# Patient Record
Sex: MALE | Race: WHITE | NOT HISPANIC OR LATINO | Employment: FULL TIME | URBAN - METROPOLITAN AREA
[De-identification: names, ages, dates, MRNs, and addresses within clinical notes are randomized per-mention and may not be internally consistent; named-entity substitution may affect disease eponyms.]

---

## 2023-02-07 ENCOUNTER — VIRTUAL VISIT (OUTPATIENT)
Dept: FAMILY MEDICINE | Facility: CLINIC | Age: 58
End: 2023-02-07
Payer: COMMERCIAL

## 2023-02-07 DIAGNOSIS — Z20.822 EXPOSURE TO COVID-19 VIRUS: Primary | ICD-10-CM

## 2023-02-07 PROCEDURE — 99203 OFFICE O/P NEW LOW 30 MIN: CPT | Mod: CS | Performed by: FAMILY MEDICINE

## 2023-02-07 RX ORDER — ACETAMINOPHEN 325 MG/1
325-650 TABLET ORAL EVERY 6 HOURS PRN
COMMUNITY
Start: 2021-05-28 | End: 2023-02-15

## 2023-02-07 RX ORDER — ACETAMINOPHEN 500 MG
500-1000 TABLET ORAL EVERY 4 HOURS PRN
Qty: 30 TABLET | Refills: 0 | Status: SHIPPED | OUTPATIENT
Start: 2023-02-07 | End: 2023-09-29

## 2023-02-07 RX ORDER — ESCITALOPRAM OXALATE 20 MG/1
20 TABLET ORAL DAILY
COMMUNITY
Start: 2023-01-16 | End: 2023-02-15

## 2023-02-07 RX ORDER — AMLODIPINE BESYLATE 10 MG/1
1 TABLET ORAL DAILY
COMMUNITY
Start: 2023-01-14 | End: 2023-02-15

## 2023-02-07 RX ORDER — LISINOPRIL 40 MG/1
40 TABLET ORAL DAILY
COMMUNITY
Start: 2023-01-16 | End: 2023-02-15

## 2023-02-07 NOTE — PROGRESS NOTES
"John is a 57 year old who is being evaluated via a billable telephone visit.      What phone number would you like to be contacted at? [phone]  How would you like to obtain your AVS? Mail a copy    Distant Location (provider location):  On-site    Assessment & Plan     Exposure to COVID-19 virus  I have offered curbside testing.  Patient would like to know his results today to help decide when he should go back to work.  He will purchase a new home test kit and if he is testing positive he will let us know and we will proceed with Paxlovid.  He will either take half a dose of amlodipine daily or take it every other days for the duration of his treatment.  No other concerns with his remaining medications.  Continue with symptomatic treatment.  - acetaminophen (TYLENOL) 500 MG tablet; Take 1-2 tablets (500-1,000 mg) by mouth every 4 hours as needed for mild pain             Nicotine/Tobacco Cessation:  He reports that he has been smoking cigarettes and other. He has never used smokeless tobacco.  Nicotine/Tobacco Cessation Plan:         COVID-19 positive patient.  Encounter for consideration of medication intervention. Patient does not qualify for a prescription. Full discussion with patient including medication options, risks and benefits. Potential drug interactions reviewed with patient.     Treatment Planned     Temporary change to home medications:     Estimated body mass index is 33.01 kg/m  as calculated from the following:    Height as of 11/26/07: 1.816 m (5' 11.5\").    Weight as of 2/21/08: 108.9 kg (240 lb).  GFR Estimate   Date Value Ref Range Status   04/04/2007 73 >60 mL/min/1.7m2 Final     No results found for: [id number]    No follow-ups on file.    Richard Plummer MD  [facility]    Subjective   John is a 57 year old accompanied by his self, presenting for the following health issues:  Covid Concern (Verbal consent given for telemed visit.  Discuss COVID " exposure)      HPI       COVID-19 Symptom Review  How many days ago did these symptoms start? 2-3 days ago.  Patient was exposed to people who tested positive for COVID    Are any of the following symptoms significant for you?    New or worsening difficulty breathing? No    Worsening cough? Yes, it's a dry cough. Has slight dry cough    Fever or chills? No    Headache: No but has nasal congestion    Sore throat: scratchy throat    Chest pain: No    Diarrhea: No    Body aches? No but does have fatigue    What treatments has patient tried? Acetaminophen   Does patient live in a nursing home, group home, or shelter? No  Does patient have a way to get food/medications during quarantined? Yes, I have a friend or family member who can help me.          He has tried testing at home but his test was invalid.  He is not immunized.        Review of Systems   Constitutional, HEENT, cardiovascular, pulmonary, gi and gu systems are negative, except as otherwise noted.      Objective           Vitals:  No vitals were obtained today due to virtual visit.    Physical Exam   healthy, alert and no distress  PSYCH: Alert and oriented times 3; coherent speech, normal   rate and volume, able to articulate logical thoughts, able   to abstract reason, no tangential thoughts, no hallucinations   or delusions  His affect is normal  RESP: No cough, no audible wheezing, able to talk in full sentences  Remainder of exam unable to be completed due to telephone visits                Phone call duration: 10 minutes

## 2023-02-15 ENCOUNTER — OFFICE VISIT (OUTPATIENT)
Dept: FAMILY MEDICINE | Facility: CLINIC | Age: 58
End: 2023-02-15
Payer: COMMERCIAL

## 2023-02-15 ENCOUNTER — TELEPHONE (OUTPATIENT)
Dept: FAMILY MEDICINE | Facility: CLINIC | Age: 58
End: 2023-02-15

## 2023-02-15 VITALS
HEART RATE: 76 BPM | OXYGEN SATURATION: 96 % | SYSTOLIC BLOOD PRESSURE: 130 MMHG | TEMPERATURE: 97.6 F | RESPIRATION RATE: 14 BRPM | DIASTOLIC BLOOD PRESSURE: 72 MMHG | BODY MASS INDEX: 34.35 KG/M2 | HEIGHT: 71 IN | WEIGHT: 245.4 LBS

## 2023-02-15 DIAGNOSIS — K42.9 UMBILICAL HERNIA WITHOUT OBSTRUCTION AND WITHOUT GANGRENE: Primary | ICD-10-CM

## 2023-02-15 DIAGNOSIS — Z13.220 SCREENING FOR HYPERLIPIDEMIA: ICD-10-CM

## 2023-02-15 DIAGNOSIS — I10 ESSENTIAL HYPERTENSION, BENIGN: ICD-10-CM

## 2023-02-15 DIAGNOSIS — Z12.11 SCREEN FOR COLON CANCER: ICD-10-CM

## 2023-02-15 DIAGNOSIS — N28.89 RENAL MASS, RIGHT: ICD-10-CM

## 2023-02-15 DIAGNOSIS — F33.41 MAJOR DEPRESSIVE DISORDER, RECURRENT EPISODE, IN PARTIAL REMISSION (H): ICD-10-CM

## 2023-02-15 DIAGNOSIS — E11.9 TYPE 2 DIABETES MELLITUS WITHOUT COMPLICATION, WITHOUT LONG-TERM CURRENT USE OF INSULIN (H): ICD-10-CM

## 2023-02-15 LAB
ANION GAP SERPL CALCULATED.3IONS-SCNC: 11 MMOL/L (ref 7–15)
BUN SERPL-MCNC: 17.4 MG/DL (ref 6–20)
CALCIUM SERPL-MCNC: 9.6 MG/DL (ref 8.6–10)
CHLORIDE SERPL-SCNC: 101 MMOL/L (ref 98–107)
CHOLEST SERPL-MCNC: 230 MG/DL
CREAT SERPL-MCNC: 0.95 MG/DL (ref 0.67–1.17)
DEPRECATED HCO3 PLAS-SCNC: 26 MMOL/L (ref 22–29)
ERYTHROCYTE [DISTWIDTH] IN BLOOD BY AUTOMATED COUNT: 12.8 % (ref 10–15)
GFR SERPL CREATININE-BSD FRML MDRD: >90 ML/MIN/1.73M2
GLUCOSE SERPL-MCNC: 131 MG/DL (ref 70–99)
HBA1C MFR BLD: 6.1 % (ref 0–5.6)
HCT VFR BLD AUTO: 45.6 % (ref 40–53)
HDLC SERPL-MCNC: 62 MG/DL
HGB BLD-MCNC: 15.2 G/DL (ref 13.3–17.7)
LDLC SERPL CALC-MCNC: 148 MG/DL
MCH RBC QN AUTO: 31.3 PG (ref 26.5–33)
MCHC RBC AUTO-ENTMCNC: 33.3 G/DL (ref 31.5–36.5)
MCV RBC AUTO: 94 FL (ref 78–100)
NONHDLC SERPL-MCNC: 168 MG/DL
PLATELET # BLD AUTO: 192 10E3/UL (ref 150–450)
POTASSIUM SERPL-SCNC: 5.1 MMOL/L (ref 3.4–5.3)
RBC # BLD AUTO: 4.86 10E6/UL (ref 4.4–5.9)
SODIUM SERPL-SCNC: 138 MMOL/L (ref 136–145)
TRIGL SERPL-MCNC: 98 MG/DL
WBC # BLD AUTO: 7.8 10E3/UL (ref 4–11)

## 2023-02-15 PROCEDURE — 80048 BASIC METABOLIC PNL TOTAL CA: CPT

## 2023-02-15 PROCEDURE — 85027 COMPLETE CBC AUTOMATED: CPT

## 2023-02-15 PROCEDURE — 80061 LIPID PANEL: CPT

## 2023-02-15 PROCEDURE — 99214 OFFICE O/P EST MOD 30 MIN: CPT

## 2023-02-15 PROCEDURE — 83036 HEMOGLOBIN GLYCOSYLATED A1C: CPT

## 2023-02-15 PROCEDURE — 36415 COLL VENOUS BLD VENIPUNCTURE: CPT

## 2023-02-15 RX ORDER — AMLODIPINE BESYLATE 10 MG/1
10 TABLET ORAL DAILY
Qty: 90 TABLET | Refills: 1 | Status: SHIPPED | OUTPATIENT
Start: 2023-02-15 | End: 2023-09-29

## 2023-02-15 RX ORDER — ESCITALOPRAM OXALATE 20 MG/1
20 TABLET ORAL DAILY
Qty: 90 TABLET | Refills: 1 | Status: SHIPPED | OUTPATIENT
Start: 2023-02-15 | End: 2023-09-29

## 2023-02-15 RX ORDER — LISINOPRIL 40 MG/1
40 TABLET ORAL DAILY
Qty: 90 TABLET | Refills: 1 | Status: SHIPPED | OUTPATIENT
Start: 2023-02-15 | End: 2023-09-29

## 2023-02-15 ASSESSMENT — ENCOUNTER SYMPTOMS
CONSTIPATION: 0
ENDOCRINE NEGATIVE: 1
ABDOMINAL PAIN: 1
NAUSEA: 0
RESPIRATORY NEGATIVE: 1
CONSTITUTIONAL NEGATIVE: 1
DIARRHEA: 0
DYSURIA: 0
HEMATOLOGIC/LYMPHATIC NEGATIVE: 1
DIZZINESS: 0
PSYCHIATRIC NEGATIVE: 1
MUSCULOSKELETAL NEGATIVE: 1
SHORTNESS OF BREATH: 0
EYES NEGATIVE: 1
ALLERGIC/IMMUNOLOGIC NEGATIVE: 1
LIGHT-HEADEDNESS: 0
CARDIOVASCULAR NEGATIVE: 1
NEUROLOGICAL NEGATIVE: 1
APPETITE CHANGE: 0
DIFFICULTY URINATING: 0
FEVER: 0
CHILLS: 0
VOMITING: 0

## 2023-02-15 NOTE — PROGRESS NOTES
Assessment & Plan     Umbilical hernia without obstruction and without gangrene  Patient reports umbilical hernia for 5 years, increasing in size with development of pain and erythema since Monday.  Patient denies nausea vomiting diarrhea.  Denies fevers.  Patient to [facility] for CT scan and plan of care will be adjusted as needed per those results.  CBC and BMP ordered for hemodynamic stability, signs of infection, and kidney function. UPDATE: CT scan shows fat-containing umbilical hernia. Consult with general surgery for increase insize with erythema and pain present. Patient notified of CT findings.   - Adult General Surg Referral; Future  - CBC with platelets; Future  - CBC with platelets  - CT Abdomen Pelvis w Contrast; Future    UPDATE: Renal mass  Incidental finding of enhancing soft tissue mass to right kidney measuring up to 2.6cm  Seen on CT scan and suspicious for renal cell carcinoma. Patient notified of finding and urology consult placed.     Screening for hyperlipidemia  Last known lipid panel May 2021..  Elevated LDL of 141 at that time.  Patient is not on a statin. Will update ASCVD and evaluate need for statin with today's lab results.   - Lipid panel reflex to direct LDL Non-fasting; Future  - Lipid panel reflex to direct LDL Non-fasting    Screen for colon cancer  Instructions and FIT screen sent with patient.  Denies any rectal bleeding or concerns.  - Fecal colorectal cancer screen FIT - Future (S+30); Future    Essential hypertension, benign  Patient taking medications as ordered, requests refill today.  Blood pressure today 130/72.  - amLODIPine (NORVASC) 10 MG tablet; Take 1 tablet (10 mg) by mouth daily  - lisinopril (ZESTRIL) 40 MG tablet; Take 1 tablet (40 mg) by mouth daily  - Basic metabolic panel  (Ca, Cl, CO2, Creat, Gluc, K, Na, BUN); Future  - Basic metabolic panel  (Ca, Cl, CO2, Creat, Gluc, K, Na, BUN)    Type 2 diabetes mellitus without complication, without long-term current use of  "insulin (H)  Last hemoglobin A1c was 12/15/2021 and was 6.2% at that time.  Discussed diet and exercise, patient taking medication as ordered.  Denies any symptoms of hyperglycemia today.  - metFORMIN (GLUCOPHAGE) 500 MG tablet; Take 1 tablet (500 mg) by mouth 2 times daily (with meals)  - Basic metabolic panel  (Ca, Cl, CO2, Creat, Gluc, K, Na, BUN); Future  - Hemoglobin A1c; Future  - Basic metabolic panel  (Ca, Cl, CO2, Creat, Gluc, K, Na, BUN)  - Hemoglobin A1c    Major depressive disorder, recurrent episode, in partial remission (H)  Patient reports stable mood, reports last time he stopped taking SSRI had return of mood symptoms.  - escitalopram (LEXAPRO) 20 MG tablet; Take 1 tablet (20 mg) by mouth daily       BMI:   Estimated body mass index is 34.23 kg/m  as calculated from the following:    Height as of this encounter: 1.803 m (5' 11\").    Weight as of this encounter: 111.3 kg (245 lb 6.4 oz).       No follow-ups on file.    NEFTALY Nassar CNP  [facility]    Savi Lopez is a 57 year old accompanied by his self, presenting for the following health issues:  Recheck Medication (Patient states no concerns, needs refills.) and Abdominal Pain (Patient concerned about a hernia.)      Patient with umbilical hernia he reports has been present for 5 years, has been worsening since Monday, has increased in size, now erythematous and painful. Has never had surgery on hernia. Denies N/V/D, denies abdominal pain outside of umbilical area. No urinary symptoms. Denies any other concerns today. No history of problems with anesthesia. No loose teeth or dentures.     Patient also here for medication refill. Annual labs or monitoring ordered.     Patient given colorectal cancer screen FIT and instructions for use.     Abdominal Pain   The current episode started more than 1 week ago. The problem occurs daily. The problem has been gradually worsening. The pain is located in the " generalized abdominal region. The quality of the pain is sharp. The pain is at a severity of 7/10. The pain is moderate. Pertinent negatives include fever, diarrhea, nausea, vomiting, constipation and dysuria. Nothing aggravates the symptoms. Nothing relieves the symptoms.   History of Present Illness       Reason for visit:  Check up on medicines and a hernia    He eats 2-3 servings of fruits and vegetables daily.He consumes 0 sweetened beverage(s) daily.He exercises with enough effort to increase his heart rate 60 or more minutes per day.  He exercises with enough effort to increase his heart rate 5 days per week.   He is taking medications regularly.     He eats a minimal amount of carbohydrates, eats mainly meat and vegetables.         Review of Systems   Constitutional: Negative.  Negative for appetite change, chills and fever.   HENT: Negative.  Negative for dental problem.    Eyes: Negative.    Respiratory: Negative.  Negative for shortness of breath.    Cardiovascular: Negative.  Negative for chest pain.   Gastrointestinal: Positive for abdominal pain. Negative for constipation, diarrhea, nausea and vomiting.   Endocrine: Negative.    Genitourinary: Negative.  Negative for difficulty urinating and dysuria.   Musculoskeletal: Negative.    Skin: Negative.    Allergic/Immunologic: Negative.    Neurological: Negative.  Negative for dizziness and light-headedness.   Hematological: Negative.    Psychiatric/Behavioral: Negative.             Objective    There were no vitals taken for this visit.  There is no height or weight on file to calculate BMI.  Physical Exam  Constitutional:       Appearance: Normal appearance.   Eyes:      Conjunctiva/sclera: Conjunctivae normal.      Pupils: Pupils are equal, round, and reactive to light.   Cardiovascular:      Rate and Rhythm: Normal rate and regular rhythm.      Heart sounds: Normal heart sounds.   Pulmonary:      Breath sounds: Normal breath sounds.   Abdominal:       General: Bowel sounds are normal.      Palpations: Abdomen is soft.      Tenderness: There is abdominal tenderness.      Hernia: A hernia is present.   Skin:     General: Skin is warm and dry.      Capillary Refill: Capillary refill takes less than 2 seconds.   Neurological:      Mental Status: He is alert and oriented to person, place, and time.

## 2023-02-15 NOTE — TELEPHONE ENCOUNTER
Pt called stating someone called him and he missed the call. Pt had CT scan and wanted to know if the call was about it. There is nothing in his chart about any call made to him. I called Urology asking if they called him, and they said not yet, but since the referral was urgent, Pt should  be called by tomorrow for schedule appointment.

## 2023-02-16 ENCOUNTER — TELEPHONE (OUTPATIENT)
Dept: FAMILY MEDICINE | Facility: CLINIC | Age: 58
End: 2023-02-16
Payer: COMMERCIAL

## 2023-02-16 ENCOUNTER — TELEPHONE (OUTPATIENT)
Dept: UROLOGY | Facility: CLINIC | Age: 58
End: 2023-02-16
Payer: COMMERCIAL

## 2023-02-16 NOTE — TELEPHONE ENCOUNTER
Patient called to report elevated cholesterol labs with ASCVD 10 year risk of 25.67% and to discuss starting a statin. No answer and voicemail box is full.     Will attempt to call patient again this week.     Recommended medication will be Atorvastatin 20mg. Preferred pharmacy needed.     NEFTALY Nassar CNP on 2/16/2023 at 10:33 AM

## 2023-02-16 NOTE — TELEPHONE ENCOUNTER
MEDICAL RECORDS REQUEST   [facility]  Urology Clinic  [address] [place] [address]  PHONE: [phone]  Fax: [phone]        FUTURE VISIT INFORMATION                                                   John Simons, : 1965 scheduled for future visit at [facility]    APPOINTMENT INFORMATION:    Date: 2023    Provider:  Ron Chao MD    Reason for Visit/Diagnosis: new kidney mass    REFERRAL INFORMATION:    Referring provider:  Alina Laguna APRN CNP in [facility] FP/IM/PEDS    RECORDS REQUESTED FOR VISIT                                                     NOTES  STATUS/DETAILS   OFFICE NOTE from referring provider  yes, 02/15/2023 -- Alina Laguna APRN CNP in [facility] FP/IM/PEDS   MEDICATION LIST  yes   LABS     URINALYSIS (UA)  no   IMAGES  yes, [facility]   02/15/2023 -- CT ABD PELVIS     PRE-VISIT CHECKLIST      Record collection complete yes   Appointment appropriately scheduled           (right time/right provider) Yes   Joint diagnostic appointment coordinated correctly          (ensure right order & amount of time) Yes   MyChart activation If no, please explain PENDING   Questionnaire complete If no, please explain PENDING

## 2023-02-16 NOTE — CONFIDENTIAL NOTE
Attempted to reach out to patient, but no answer and could not leave VM because mailbox was full.  Timeslot saved for patient for VV with .      Oliverio Rider, RN  RN Care Coordinator - Urology

## 2023-02-16 NOTE — TELEPHONE ENCOUNTER
Routing comment copied:    Hello,     Thanks for your message. The call referred to in your message was not me, but I did speak with the patient regarding results yesterday after his CT, and that is reflected in my note from yesterday instead of another phone call.     Another thing to note: right now my pool is titled as just my name, so you sent the message to my pool and not directly to me. Not sure if that was intentional, just something to point out. There is a ticket in to have it changed, but until then people will just have to note whether there is a one person icon or a group icon next to the name.     Jarett,   Alina Laguna, NEFTALY CNP on 2/16/2023 at 10:15 AM          Encounter closed.

## 2023-02-20 ENCOUNTER — TELEPHONE (OUTPATIENT)
Dept: UROLOGY | Facility: CLINIC | Age: 58
End: 2023-02-20
Payer: COMMERCIAL

## 2023-02-20 ENCOUNTER — TELEPHONE (OUTPATIENT)
Dept: FAMILY MEDICINE | Facility: CLINIC | Age: 58
End: 2023-02-20
Payer: COMMERCIAL

## 2023-02-20 DIAGNOSIS — E78.00 HYPERCHOLESTEREMIA: Primary | ICD-10-CM

## 2023-02-20 NOTE — CONFIDENTIAL NOTE
Called again about upcoming appointment with Dr. Chao and was unable to leave VM for patient.  Patient does not have my chart access at this time.    Oliverio Rider, RN  RN Care Coordinator - Urology

## 2023-02-20 NOTE — TELEPHONE ENCOUNTER
02/20/23  Pt called back returning call. Please try calling pt again. Pt states the best number to reach him is [phone]. Pt states he is unable to clear his voice mailbox because his phone is acting up.   Annika

## 2023-02-22 ENCOUNTER — PRE VISIT (OUTPATIENT)
Dept: UROLOGY | Facility: CLINIC | Age: 58
End: 2023-02-22

## 2023-02-22 ENCOUNTER — VIRTUAL VISIT (OUTPATIENT)
Dept: UROLOGY | Facility: CLINIC | Age: 58
End: 2023-02-22
Payer: COMMERCIAL

## 2023-02-22 DIAGNOSIS — Z91.199 NO-SHOW FOR APPOINTMENT: Primary | ICD-10-CM

## 2023-02-22 NOTE — LETTER
2/22/2023       RE: Nora Simons  [address] [facility] [address]     Dear Colleague,    Thank you for referring your patient, Nora Simons, to the [facility]OLOG[facility] [place] at [facility]. Please see a copy of my visit note below.    The patient was not seen on this date.  The below info is for reference only.      Urology Clinic  URSULA Chao MD    Feb 22, 2023  57 year old male    ASSESSMENT AND PLAN    Right renal mass.  Interpolar.  Posterior Exophytic    2/15/23 CT shows 2.6cm mass.  There is no non contrast scan so I can't comment on enhancement.    Umbilical Hernia    2/15/23 CT shows the hernia    The following are complicating factors.  These increase the risk of perioperative complications and make treatment more complicated.    Diabetes mellitus Type 2    Depression    Plan    Renal ultrasound with doppler to confirm flow within the mass.      _______________________________________________________________________    HPI       This is a select list of notes I reviewed during this visit.  There may be additional notes I reviewed which are not listed:    2/15/23 Alina Moorebriana  APRN CNP      2/15/23 CT Abdomen/Pelvis with contrast   I reviewed the radiologic images and report from this radiologic exam.  My independent interpretation is:    See Assessment and Plan above for my review of this scan           I reviewed the following laboratory data and went over findings with patient:  Recent Labs   Lab Test 02/15/23  0816   WBC 7.8   HGB 15.2        Recent Labs   Lab Test 02/15/23  0816   CR 0.95   GFRESTIMATED >90   *         CC  Patient Care Team:  [facility], [facility] as PCP - General      Copy to patient  NORA SIMONS  [address] [facility] [address]  This patient was a no show for this scheduled appointment.      Again, thank you for allowing me to participate in the care of your  patient.      Sincerely,    Ron Chao MD

## 2023-02-22 NOTE — PROGRESS NOTES
"Video-Visit Details    Type of service:  Video Visit    Video Start Time (time video started): ***    Video End Time (time video stopped): ***    Originating Location (pt. Location): {video visit patient location:433471::\"Home\"}    {PROVIDER LOCATION On-site should be selected for visits conducted from your clinic location or adjoining [facility] hospital, academic office, or other nearby [facility] building. Off-site should be selected for all other provider locations, including home:[id number]}    Distant Location (provider location):  {virtual location provider:[id number]}    Mode of Communication:  Video Conference via [employer]        "

## 2023-02-22 NOTE — LETTER
Date:February 27, 2023      Provider requested that no letter be sent. Do not send.       [facility]

## 2023-02-22 NOTE — PROGRESS NOTES
The patient was not seen on this date.  The below info is for reference only.      Urology Clinic  URSULA Chao MD    Feb 22, 2023  57 year old male    ASSESSMENT AND PLAN    Right renal mass.  Interpolar.  Posterior Exophytic    2/15/23 CT shows 2.6cm mass.  There is no non contrast scan so I can't comment on enhancement.    Umbilical Hernia    2/15/23 CT shows the hernia    The following are complicating factors.  These increase the risk of perioperative complications and make treatment more complicated.    Diabetes mellitus Type 2    Depression    Plan    Renal ultrasound with doppler to confirm flow within the mass.      _______________________________________________________________________    HPI       This is a select list of notes I reviewed during this visit.  There may be additional notes I reviewed which are not listed:    2/15/23 Alina Laguna  APRN CNP      2/15/23 CT Abdomen/Pelvis with contrast   I reviewed the radiologic images and report from this radiologic exam.  My independent interpretation is:    See Assessment and Plan above for my review of this scan           I reviewed the following laboratory data and went over findings with patient:  Recent Labs   Lab Test 02/15/23  0816   WBC 7.8   HGB 15.2        Recent Labs   Lab Test 02/15/23  0816   CR 0.95   GFRESTIMATED >90   *         CC  Patient Care Team:  Clinic, [facility] as PCP - General      Copy to patient  NORA JUSTIN DENNY  [address] [place] [id number]  This patient was a no show for this scheduled appointment.

## 2023-02-23 DIAGNOSIS — N28.89 RENAL MASS: Primary | ICD-10-CM

## 2023-02-23 NOTE — TELEPHONE ENCOUNTER
Called pt. He is willing to trial the atorvastatin 20mg. Has never been on a statin in past. Can send to [facility].

## 2023-03-02 ENCOUNTER — PRE VISIT (OUTPATIENT)
Dept: UROLOGY | Facility: CLINIC | Age: 58
End: 2023-03-02
Payer: COMMERCIAL

## 2023-03-02 NOTE — TELEPHONE ENCOUNTER
Reason for visit: Consult     Relevant information: renal mass    Records/imaging/labs/orders: us sched in epic    Pt called: no    At Rooming: normal

## 2023-03-03 RX ORDER — ATORVASTATIN CALCIUM 20 MG/1
20 TABLET, FILM COATED ORAL DAILY
Qty: 30 TABLET | Refills: 3 | Status: SHIPPED | OUTPATIENT
Start: 2023-03-03 | End: 2023-09-29

## 2023-03-08 ENCOUNTER — OFFICE VISIT (OUTPATIENT)
Dept: SURGERY | Facility: CLINIC | Age: 58
End: 2023-03-08
Payer: COMMERCIAL

## 2023-03-08 VITALS
HEIGHT: 71 IN | DIASTOLIC BLOOD PRESSURE: 78 MMHG | WEIGHT: 249 LBS | BODY MASS INDEX: 34.86 KG/M2 | SYSTOLIC BLOOD PRESSURE: 133 MMHG | HEART RATE: 97 BPM

## 2023-03-08 DIAGNOSIS — K42.9 UMBILICAL HERNIA WITHOUT OBSTRUCTION AND WITHOUT GANGRENE: ICD-10-CM

## 2023-03-08 PROCEDURE — 99204 OFFICE O/P NEW MOD 45 MIN: CPT | Performed by: SURGERY

## 2023-03-08 NOTE — TELEPHONE ENCOUNTER
MEDICAL RECORDS REQUEST   [facility]  Urology Clinic  [address] [place] [address]  PHONE: [phone]  Fax: [phone]                                                    FUTURE VISIT INFORMATION                                                   John Simons, : 1965 scheduled for future visit at [facility]     APPOINTMENT INFORMATION:    Date: 2023    Provider:  Ron Chao MD    Reason for Visit/Diagnosis: new kidney mass     REFERRAL INFORMATION:    Referring provider:  Alina Laguna APRN CNP in [facility] FP/IM/PEDS     RECORDS REQUESTED FOR VISIT                                                      NOTES   STATUS/DETAILS   OFFICE NOTE from referring provider   yes, 02/15/2023 -- Alina Laguna APRN CNP in [facility] FP/IM/PEDS   MEDICATION LIST   yes   LABS       URINALYSIS (UA)   no   IMAGES   yes, [facility]   02/15/2023 -- CT ABD PELVIS      PRE-VISIT CHECKLIST        Record collection complete yes   Appointment appropriately scheduled           (right time/right provider) Yes   Joint diagnostic appointment coordinated correctly          (ensure right order & amount of time) Yes   MyChart activation If no, please explain PENDING   Questionnaire complete If no, please explain PENDING

## 2023-03-08 NOTE — LETTER
3/8/2023         RE: John Simons  [address] [place] [id number]        Dear Colleague,    Thank you for referring your patient, John Simons, to the [facility] [place]. Please see a copy of my visit note below.    HPI:  John Simons is a 57 year old male who was referred to me by Clinic, [facility] for evaluation of a local hernia.  He has had this for about 4 years.  He had it looked at once before but it was not very bothersome.  Recently it has been more painful.  This pain is intermittent and can last for couple of days and then usually gets better for a while.  He has diabetes but his last A1c was 6.1.  He is on metformin.  He does smoke about 4 cigarettes/day.  He uses a synthetic nicotine chew as well.  His weight is down slightly from a high of 260 is 249 today.  He had a CT scan recently that showed a mass on his kidney.  He has an appointment with urology next week to discuss this.    Allergies:Bupropion    Past Medical History:   Diagnosis Date     Unspecified essential hypertension        Past Surgical History:   Procedure Laterality Date     BACK SURGERY       HC REMOVE TONSILS/ADENOIDS,<11 Y/O  10/04/1968    Tonsillectomy and adenoidectomy < age 11   No prior abdominal surgeries    Current Outpatient Medications   Medication Sig Dispense Refill     acetaminophen (TYLENOL) 500 MG tablet Take 1-2 tablets (500-1,000 mg) by mouth every 4 hours as needed for mild pain 30 tablet 0     amLODIPine (NORVASC) 10 MG tablet Take 1 tablet (10 mg) by mouth daily 90 tablet 1     atorvastatin (LIPITOR) 20 MG tablet Take 1 tablet (20 mg) by mouth daily 30 tablet 3     escitalopram (LEXAPRO) 20 MG tablet Take 1 tablet (20 mg) by mouth daily 90 tablet 1     lisinopril (ZESTRIL) 40 MG tablet Take 1 tablet (40 mg) by mouth daily 90 tablet 1     metFORMIN (GLUCOPHAGE) 500 MG tablet Take 1 tablet (500 mg) by mouth 2 times daily (with  "meals) 180 tablet 1       Family History   Problem Relation Age of Onset     Heart Disease Paternal Grandfather         did not affect him until later years        reports that he has been smoking cigarettes and other. He started smoking about 11 years ago. He has never used smokeless tobacco. He reports current alcohol use.      /78   Pulse 97   Ht 1.803 m (5' 11\")   Wt 112.9 kg (249 lb)   BMI 34.73 kg/m    Body mass index is 34.73 kg/m .    EXAM:  GENERAL: Well developed male in NAD  CV: RRR  PULM: Non-labored breathing on RA  ABDOMEN: Soft and nondistended.  He has a reducible umbilical inguinal hernia is minimally tender.  The defect feels to be about 3 cm.  NEURO: No obvious deficits noted.  EXT: No edema, no obvious deformities or any other abnormalities    LABS: Hemoglobin A1c 6.1 on February 15 of this year    IMAGES: CT scan is personally reviewed and reviewed with the patient.  He has a fat-containing umbilical hernia that measures 3 x 2.5 cm.    Assessment/Plan:    John Simons is a 57 year old male presenting with a symptomatic umbilical hernia.  This is bothersome enough that the patient would like this repaired.  We discussed the pathophysiology of umbilical hernias and various repair options including open and robotic.  I think due to the size of this hernia and the patient's body habitus that a robotic repair would be preferable.  We discussed the main risk factors for complications with repair including smoking, uncontrolled diabetes, and obesity/weight gain.  The patient has been cutting back drastically on his cigarette usage and he thinks he will be able to stop fairly soon.  Additionally he has this kidney mass that he is seeing urology for in the next week or so.  I think it makes sense to wait until that evaluation before scheduling an elective hernia repair.    I will call the patient in a month to check on his smoking status and see how things went with urology.  If going " well, we will plan for robotic umbilical hernia repair with mesh.      Iker Alas MD  General Surgeon  [address]  [place] [address]?  Office: [phone]        Again, thank you for allowing me to participate in the care of your patient.        Sincerely,        Iker Alas MD

## 2023-03-08 NOTE — PROGRESS NOTES
HPI:  John Simons is a 57 year old male who was referred to me by Clinic, [facility] for evaluation of a local hernia.  He has had this for about 4 years.  He had it looked at once before but it was not very bothersome.  Recently it has been more painful.  This pain is intermittent and can last for couple of days and then usually gets better for a while.  He has diabetes but his last A1c was 6.1.  He is on metformin.  He does smoke about 4 cigarettes/day.  He uses a synthetic nicotine chew as well.  His weight is down slightly from a high of 260 is 249 today.  He had a CT scan recently that showed a mass on his kidney.  He has an appointment with urology next week to discuss this.    Allergies:Bupropion    Past Medical History:   Diagnosis Date     Unspecified essential hypertension        Past Surgical History:   Procedure Laterality Date     BACK SURGERY       HC REMOVE TONSILS/ADENOIDS,<13 Y/O  10/04/1968    Tonsillectomy and adenoidectomy < age 11   No prior abdominal surgeries    Current Outpatient Medications   Medication Sig Dispense Refill     acetaminophen (TYLENOL) 500 MG tablet Take 1-2 tablets (500-1,000 mg) by mouth every 4 hours as needed for mild pain 30 tablet 0     amLODIPine (NORVASC) 10 MG tablet Take 1 tablet (10 mg) by mouth daily 90 tablet 1     atorvastatin (LIPITOR) 20 MG tablet Take 1 tablet (20 mg) by mouth daily 30 tablet 3     escitalopram (LEXAPRO) 20 MG tablet Take 1 tablet (20 mg) by mouth daily 90 tablet 1     lisinopril (ZESTRIL) 40 MG tablet Take 1 tablet (40 mg) by mouth daily 90 tablet 1     metFORMIN (GLUCOPHAGE) 500 MG tablet Take 1 tablet (500 mg) by mouth 2 times daily (with meals) 180 tablet 1       Family History   Problem Relation Age of Onset     Heart Disease Paternal Grandfather         did not affect him until later years        reports that he has been smoking cigarettes and other. He started smoking about 11 years ago. He has never used smokeless  "tobacco. He reports current alcohol use.      /78   Pulse 97   Ht 1.803 m (5' 11\")   Wt 112.9 kg (249 lb)   BMI 34.73 kg/m    Body mass index is 34.73 kg/m .    EXAM:  GENERAL: Well developed male in NAD  CV: RRR  PULM: Non-labored breathing on RA  ABDOMEN: Soft and nondistended.  He has a reducible umbilical inguinal hernia is minimally tender.  The defect feels to be about 3 cm.  NEURO: No obvious deficits noted.  EXT: No edema, no obvious deformities or any other abnormalities    LABS: Hemoglobin A1c 6.1 on February 15 of this year    IMAGES: CT scan is personally reviewed and reviewed with the patient.  He has a fat-containing umbilical hernia that measures 3 x 2.5 cm.    Assessment/Plan:    John Simons is a 57 year old male presenting with a symptomatic umbilical hernia.  This is bothersome enough that the patient would like this repaired.  We discussed the pathophysiology of umbilical hernias and various repair options including open and robotic.  I think due to the size of this hernia and the patient's body habitus that a robotic repair would be preferable.  We discussed the main risk factors for complications with repair including smoking, uncontrolled diabetes, and obesity/weight gain.  The patient has been cutting back drastically on his cigarette usage and he thinks he will be able to stop fairly soon.  Additionally he has this kidney mass that he is seeing urology for in the next week or so.  I think it makes sense to wait until that evaluation before scheduling an elective hernia repair.    I will call the patient in a month to check on his smoking status and see how things went with urology.  If going well, we will plan for robotic umbilical hernia repair with mesh.      Iker Alas MD  General Surgeon  [facility]  Surgery [address]  [address]  [place] [address]?  Office: [phone]    "

## 2023-03-13 ENCOUNTER — PREP FOR PROCEDURE (OUTPATIENT)
Dept: UROLOGY | Facility: CLINIC | Age: 58
End: 2023-03-13
Payer: COMMERCIAL

## 2023-03-13 ENCOUNTER — HOSPITAL ENCOUNTER (OUTPATIENT)
Dept: ULTRASOUND IMAGING | Facility: CLINIC | Age: 58
Discharge: HOME | End: 2023-03-13
Attending: UROLOGY | Admitting: UROLOGY
Payer: COMMERCIAL

## 2023-03-13 DIAGNOSIS — N28.89 RENAL MASS: ICD-10-CM

## 2023-03-13 DIAGNOSIS — N28.89 RENAL MASS: Primary | ICD-10-CM

## 2023-03-13 PROCEDURE — 76770 US EXAM ABDO BACK WALL COMP: CPT

## 2023-03-14 ENCOUNTER — TELEPHONE (OUTPATIENT)
Dept: UROLOGY | Facility: CLINIC | Age: 58
End: 2023-03-14
Payer: COMMERCIAL

## 2023-03-14 NOTE — CONFIDENTIAL NOTE
Called patient and left VM regarding need to change upcoming appointment.  Dr. Chao requested additional imaging for patient before appointment on Friday.  Appointment made for patient to have imaging this Friday at noon.  This author will reschedule appointment with Dr. Chao to later on Friday or another date, depending upon patient's preference.    This author's direct line provided for future questions/concerns.    Oliverio Rider RN  RN Care Coordinator - Urology'

## 2023-03-14 NOTE — CONFIDENTIAL NOTE
Left additional voice mail for patient with more detailed information including the time/date and location of his upcoming MRI.  This author also explained again about the need to change his existing appointment for a later time or date.  This author's direct line provided for future questions/concerns.    Oliverio Rider, RN  RN Care Coordinator - Urology

## 2023-03-17 ENCOUNTER — PREP FOR PROCEDURE (OUTPATIENT)
Dept: UROLOGY | Facility: CLINIC | Age: 58
End: 2023-03-17

## 2023-03-17 ENCOUNTER — HOSPITAL ENCOUNTER (OUTPATIENT)
Dept: MRI IMAGING | Facility: CLINIC | Age: 58
Discharge: HOME | End: 2023-03-17
Attending: UROLOGY | Admitting: UROLOGY
Payer: COMMERCIAL

## 2023-03-17 ENCOUNTER — PRE VISIT (OUTPATIENT)
Dept: UROLOGY | Facility: CLINIC | Age: 58
End: 2023-03-17

## 2023-03-17 ENCOUNTER — VIRTUAL VISIT (OUTPATIENT)
Dept: UROLOGY | Facility: CLINIC | Age: 58
End: 2023-03-17
Payer: COMMERCIAL

## 2023-03-17 ENCOUNTER — TELEPHONE (OUTPATIENT)
Dept: UROLOGY | Facility: CLINIC | Age: 58
End: 2023-03-17

## 2023-03-17 DIAGNOSIS — N28.89 RIGHT RENAL MASS: ICD-10-CM

## 2023-03-17 DIAGNOSIS — N28.89 RENAL MASS: ICD-10-CM

## 2023-03-17 DIAGNOSIS — N28.89 RIGHT RENAL MASS: Primary | ICD-10-CM

## 2023-03-17 PROCEDURE — 99214 OFFICE O/P EST MOD 30 MIN: CPT | Mod: TEL | Performed by: UROLOGY

## 2023-03-17 PROCEDURE — 74183 MRI ABD W/O CNTR FLWD CNTR: CPT

## 2023-03-17 PROCEDURE — 74183 MRI ABD W/O CNTR FLWD CNTR: CPT | Mod: 26 | Performed by: RADIOLOGY

## 2023-03-17 PROCEDURE — 255N000002 HC RX 255 OP 636: Performed by: SURGERY

## 2023-03-17 PROCEDURE — A9585 GADOBUTROL INJECTION: HCPCS | Performed by: SURGERY

## 2023-03-17 RX ORDER — CEFAZOLIN SODIUM 2 G/50ML
2 SOLUTION INTRAVENOUS SEE ADMIN INSTRUCTIONS
Status: CANCELLED | OUTPATIENT
Start: 2023-03-17

## 2023-03-17 RX ORDER — CEFAZOLIN SODIUM 2 G/50ML
2 SOLUTION INTRAVENOUS
Status: CANCELLED | OUTPATIENT
Start: 2023-03-17

## 2023-03-17 RX ORDER — GADOBUTROL 604.72 MG/ML
10 INJECTION INTRAVENOUS ONCE
Status: COMPLETED | OUTPATIENT
Start: 2023-03-17 | End: 2023-03-17

## 2023-03-17 RX ADMIN — GADOBUTROL 10 ML: 604.72 INJECTION INTRAVENOUS at 12:33

## 2023-03-17 NOTE — NURSING NOTE
Is the patient currently in the state of [place]? YES    Visit mode: TELEPHONE     If the visit is dropped, the patient can be reconnected by: VIDEO VISIT: Text to cell phone: [phone]    Will anyone else be joining the visit? NO      How would you like to obtain your AVS? Mail a copy    Are changes needed to the allergy or medication list? NO    Reason for visit:   Chief Complaint   Patient presents with     Video Visit     Follow-up, post MRI

## 2023-03-17 NOTE — CONFIDENTIAL NOTE
Spoke with patient regarding upcoming schedule changes to today's schedule.  Patient stated that he understood the changes.  This author's direct line provided for future questions/concerns.    Oliverio Rider, RN  RN Care Coordinator - Urology

## 2023-03-17 NOTE — PROGRESS NOTES
Urology Clinic  URSULA Chao MD  Mar 17, 2023  57 year old male    ASSESSMENT AND PLAN    Right renal mass.  Interpolar.  Posterior Exophytic    2/15/23 CT shows 2.6cm mass.  There is no non contrast scan so I can't comment on enhancement.    Umbilical Hernia    2/15/23 CT shows the hernia    Right adrenal mass    3/17/23 MRI 1cm     The following are complicating factors.  These increase the risk of perioperative complications and make treatment more complicated.    Diabetes mellitus Type 2    Depression    During counseling for this visit, we covered the risk of renal cell carcinoma and how this renal mass may be a non-cancerous lesion.  We covered options including observation, biopsy, cryoablation, partial nephrectomy, and nephrectomy.  We covered different surgical approaches such as percutaneous, laparoscopic, robotic, and open surgery.  We covered the risk of observation which is metastatic spread and need for continued observation.  We covered surgical risks which include but are not limited to heart attack, stroke, blood clot in the legs or lungs, death, injury to surrounding organs (intestine, liver, spleen, pancreas, lung, muscles, nerves), hernias, loss of sensation around incisions, decreased renal function, and infection.  Additional procedures may be necessary in the perioperative period.  If partial nephrectomy is attempted than full nephrectomy may be required.  Conversion to open surgery may be necessary if minimally invasive techniques are used.  We discussed the risks of blood transfusion including HIV and hepatitis.   There are other additional unexpected complications which may occur.        Plan    Right percutaneous cryoablation.  We discussed how we would not address the adrenal nodule at the time of the cryoablation.  He is aware that this would have been done with partial nephrectomy.  He is also aware that the risk of metastatic disease is quite low given the size of the renal mass.   We will plan for close follow-up of the renal mass on subsequent imaging.    _______________________________________________________________________    HPI   He doing well today and denies any problems    This is a select list of notes I reviewed during this visit.  There may be additional notes I reviewed which are not listed:    2/15/23 Alina Laguna  APRN CNP    3/17/23 MRI Abdomen With and Without Contrast   I reviewed the radiologic images and report from this radiologic exam.  My independent interpretation is:    2.5cm right renal mass. Right adrenal mass      2/15/23 CT Abdomen/Pelvis with contrast   I reviewed the radiologic images and report from this radiologic exam.  My independent interpretation is:    See Assessment and Plan above for my review of this scan           I reviewed the following laboratory data and went over findings with patient:  Recent Labs   Lab Test 02/15/23  0816   WBC 7.8   HGB 15.2        Recent Labs   Lab Test 02/15/23  0816   CR 0.95   GFRESTIMATED >90   *         CC  Patient Care Team:  [facility], [facility] as PCP - Richard Peralta MD as Assigned PCP      Copy to patient  NORA DENNY  [address] [place] [phone]  This patient was a no show for this scheduled appointment.

## 2023-03-17 NOTE — PROGRESS NOTES
"Video-Visit Details    Type of service:  Video Visit    Video Start Time (time video started): ***    Video End Time (time video stopped): ***    Originating Location (pt. Location): {video visit patient location:822144::\"Home\"}    {PROVIDER LOCATION On-site should be selected for visits conducted from your clinic location or adjoining [facility] hospital, academic office, or other nearby [facility] building. Off-site should be selected for all other provider locations, including home:174[address]}    Distant Location (provider location):  {virtual location provider:790636}    Mode of Communication:  Video Conference via Straight Up English        "

## 2023-03-17 NOTE — LETTER
Date:March 27, 2023      Provider requested that no letter be sent. Do not send.       [facility]

## 2023-03-17 NOTE — LETTER
3/17/2023       RE: John Simons  [address] [place] [id number]     Dear Colleague,    Thank you for referring your patient, John Simons, to the [facility] UROLOGY [facility]INIC [place] at [facility]. Please see a copy of my visit note below.      Urology Clinic  URSULA Chao MD  Mar 17, 2023  57 year old male    ASSESSMENT AND PLAN    Right renal mass.  Interpolar.  Posterior Exophytic    2/15/23 CT shows 2.6cm mass.  There is no non contrast scan so I can't comment on enhancement.    Umbilical Hernia    2/15/23 CT shows the hernia    Right adrenal mass    3/17/23 MRI 1cm     The following are complicating factors.  These increase the risk of perioperative complications and make treatment more complicated.    Diabetes mellitus Type 2    Depression    During counseling for this visit, we covered the risk of renal cell carcinoma and how this renal mass may be a non-cancerous lesion.  We covered options including observation, biopsy, cryoablation, partial nephrectomy, and nephrectomy.  We covered different surgical approaches such as percutaneous, laparoscopic, robotic, and open surgery.  We covered the risk of observation which is metastatic spread and need for continued observation.  We covered surgical risks which include but are not limited to heart attack, stroke, blood clot in the legs or lungs, death, injury to surrounding organs (intestine, liver, spleen, pancreas, lung, muscles, nerves), hernias, loss of sensation around incisions, decreased renal function, and infection.  Additional procedures may be necessary in the perioperative period.  If partial nephrectomy is attempted than full nephrectomy may be required.  Conversion to open surgery may be necessary if minimally invasive techniques are used.  We discussed the risks of blood transfusion including HIV and hepatitis.   There are other additional unexpected complications which  may occur.        Plan    Right percutaneous cryoablation.  We discussed how we would not address the adrenal nodule at the time of the cryoablation.  He is aware that this would have been done with partial nephrectomy.  He is also aware that the risk of metastatic disease is quite low given the size of the renal mass.  We will plan for close follow-up of the renal mass on subsequent imaging.    _______________________________________________________________________    HPI   He doing well today and denies any problems    This is a select list of notes I reviewed during this visit.  There may be additional notes I reviewed which are not listed:    2/15/23 Alina Laguna  APRN CNP    3/17/23 MRI Abdomen With and Without Contrast   I reviewed the radiologic images and report from this radiologic exam.  My independent interpretation is:    2.5cm right renal mass. Right adrenal mass      2/15/23 CT Abdomen/Pelvis with contrast   I reviewed the radiologic images and report from this radiologic exam.  My independent interpretation is:    See Assessment and Plan above for my review of this scan           I reviewed the following laboratory data and went over findings with patient:  Recent Labs   Lab Test 02/15/23  0816   WBC 7.8   HGB 15.2        Recent Labs   Lab Test 02/15/23  0816   CR 0.95   GFRESTIMATED >90   *         CC  Patient Care Team:  [facility], [facility] as PCP - Richard Peralta MD as Assigned PCP      Copy to patient  NORA DENNY  [address] [place] [id number]  This patient was a no show for this scheduled appointment.      Again, thank you for allowing me to participate in the care of your patient.      Sincerely,    Ron Chao MD

## 2023-03-21 DIAGNOSIS — N28.89 RIGHT RENAL MASS: Primary | ICD-10-CM

## 2023-03-24 ENCOUNTER — PREP FOR PROCEDURE (OUTPATIENT)
Dept: UROLOGY | Facility: CLINIC | Age: 58
End: 2023-03-24
Payer: COMMERCIAL

## 2023-03-24 PROBLEM — N28.89 RIGHT RENAL MASS: Status: ACTIVE | Noted: 2023-03-24

## 2023-04-12 ENCOUNTER — VIRTUAL VISIT (OUTPATIENT)
Dept: SURGERY | Facility: CLINIC | Age: 58
End: 2023-04-12
Payer: COMMERCIAL

## 2023-04-12 DIAGNOSIS — K42.9 UMBILICAL HERNIA WITHOUT OBSTRUCTION AND WITHOUT GANGRENE: Primary | ICD-10-CM

## 2023-04-12 PROCEDURE — 99212 OFFICE O/P EST SF 10 MIN: CPT | Mod: 93 | Performed by: SURGERY

## 2023-04-12 NOTE — LETTER
"    4/12/2023         RE: John Simons  [address] [place] [phone]        Dear Colleague,    Thank you for referring your patient, John Simons, to the [facility] AND [facility]. Please see a copy of my visit note below.      The patient has been notified of following:     \"This telephone visit will be conducted via a call between you and your physician/provider. We have found that certain health care needs can be provided without the need for a physical exam.  This service lets us provide the care you need with a short phone conversation.  If a prescription is necessary we can send it directly to your pharmacy.  If lab work is needed we can place an order for that and you can then stop by our lab to have the test done at a later time.    Telephone visits are billed at different rates depending on your insurance coverage. During this emergency period, for some insurers they may be billed the same as an in-person visit.  Please reach out to your insurance provider with any questions.    If during the course of the call the physician/provider feels a telephone visit is not appropriate, you will not be charged for this service.\"    Patient has given verbal consent to a Telephone visit? Yes    What phone number would you like to be contacted at? [phone]     Patient would like to receive their AVS by Mail a copy    Are there any specific questions or needs that you would like addressed at your visit today?    Follow-up hernia.        Distant Location (provider location):  On-site    Additional provider notes: Patient is a 57-year-old man who I saw for an umbilical hernia.  He also had a renal mass that required some evaluation.  We set up a phone call today to touch base and see how he is doing.  Generally doing well.  He seen Dr. Chao and there is plans for a cryoablation of this mass.  He is not sure when that will be scheduled.  His hernia has not " really changed significantly.  It still not very bothersome.  I think under the circumstances, we should continue to wait until he is finished with his cryoablation.  He will call us when that is scheduled and then I can figure out the best time to do his repair.  Looking back at his imaging, I think an open or robotic approach would be fine.  I will discuss this with him at the time of scheduling.    Phone call duration: 5 minutes    Iker Alas MD  General Surgeon  [facility]  Surgery [address] [address]?  Office: [phone]            Again, thank you for allowing me to participate in the care of your patient.        Sincerely,        Iker Alas MD

## 2023-04-12 NOTE — PROGRESS NOTES
"  The patient has been notified of following:     \"This telephone visit will be conducted via a call between you and your physician/provider. We have found that certain health care needs can be provided without the need for a physical exam.  This service lets us provide the care you need with a short phone conversation.  If a prescription is necessary we can send it directly to your pharmacy.  If lab work is needed we can place an order for that and you can then stop by our lab to have the test done at a later time.    Telephone visits are billed at different rates depending on your insurance coverage. During this emergency period, for some insurers they may be billed the same as an in-person visit.  Please reach out to your insurance provider with any questions.    If during the course of the call the physician/provider feels a telephone visit is not appropriate, you will not be charged for this service.\"    Patient has given verbal consent to a Telephone visit? Yes    What phone number would you like to be contacted at? [phone]     Patient would like to receive their AVS by Mail a copy    Are there any specific questions or needs that you would like addressed at your visit today?    Follow-up hernia.        Distant Location (provider location):  On-site    Additional provider notes: Patient is a 57-year-old man who I saw for an umbilical hernia.  He also had a renal mass that required some evaluation.  We set up a phone call today to touch base and see how he is doing.  Generally doing well.  He seen Dr. Chao and there is plans for a cryoablation of this mass.  He is not sure when that will be scheduled.  His hernia has not really changed significantly.  It still not very bothersome.  I think under the circumstances, we should continue to wait until he is finished with his cryoablation.  He will call us when that is scheduled and then I can figure out the best time to do his repair.  Looking back at his " imaging, I think an open or robotic approach would be fine.  I will discuss this with him at the time of scheduling.    Phone call duration: 5 minutes    Iker Alas MD  General Surgeon  [facility]  Surgery [address] [address]?  Office: [phone]

## 2023-05-03 ENCOUNTER — TELEPHONE (OUTPATIENT)
Dept: UROLOGY | Facility: CLINIC | Age: 58
End: 2023-05-03
Payer: COMMERCIAL

## 2023-05-03 NOTE — TELEPHONE ENCOUNTER
Patient is scheduled for a surgical procedure with Dr. Chao and Dr. Ruano      Spoke with: Patient via phone      Date of Surgery/Procedure: Wednesday May 31, 2023    Location: [facility]      Informed patient they will need an adult : Yes     Pre-op: Yes     PAC EVAL: Wednesday May 17, 2023    Additional imaging/appointments: CT prior to post op     Post-op: Friday July 14, 2023     Additional comments:      Surgery packet: Sent via mail 5/3/23    Patient is aware that surgery time is tentative to change and to expect a call 3-1 business days from Pre Admission Nursing for instructions and arrival time

## 2023-05-04 NOTE — TELEPHONE ENCOUNTER
FUTURE VISIT INFORMATION      SURGERY INFORMATION:    Date: 5/31/23    Location: [facility] or    Surgeon:  Juan Ruano MD Anderson, Ron Castaneda MD    Anesthesia Type:  General    Procedure: ANESTHESIA OUT OF OR COMPUTED TOMOGRAPHY RENAL CRYOABLATION@0900    RECORDS REQUESTED FROM:       Primary Care Provider: [facility]    Pertinent Medical History: hypertension

## 2023-05-05 DIAGNOSIS — N28.89 RIGHT RENAL MASS: Primary | ICD-10-CM

## 2023-05-17 ENCOUNTER — ANESTHESIA EVENT (OUTPATIENT)
Dept: SURGERY | Facility: CLINIC | Age: 58
End: 2023-05-17
Payer: COMMERCIAL

## 2023-05-17 ENCOUNTER — PRE VISIT (OUTPATIENT)
Dept: SURGERY | Facility: CLINIC | Age: 58
End: 2023-05-17

## 2023-05-17 ENCOUNTER — OFFICE VISIT (OUTPATIENT)
Dept: SURGERY | Facility: CLINIC | Age: 58
End: 2023-05-17
Payer: COMMERCIAL

## 2023-05-17 ENCOUNTER — LAB (OUTPATIENT)
Dept: LAB | Facility: CLINIC | Age: 58
End: 2023-05-17
Payer: COMMERCIAL

## 2023-05-17 VITALS
DIASTOLIC BLOOD PRESSURE: 74 MMHG | OXYGEN SATURATION: 99 % | BODY MASS INDEX: 34.86 KG/M2 | RESPIRATION RATE: 16 BRPM | TEMPERATURE: 97.9 F | HEART RATE: 62 BPM | SYSTOLIC BLOOD PRESSURE: 125 MMHG | HEIGHT: 71 IN | WEIGHT: 249 LBS

## 2023-05-17 DIAGNOSIS — N28.89 RIGHT RENAL MASS: ICD-10-CM

## 2023-05-17 DIAGNOSIS — Z11.4 SCREENING FOR HIV (HUMAN IMMUNODEFICIENCY VIRUS): ICD-10-CM

## 2023-05-17 DIAGNOSIS — E11.9 TYPE 2 DIABETES MELLITUS WITHOUT COMPLICATION, WITHOUT LONG-TERM CURRENT USE OF INSULIN (H): ICD-10-CM

## 2023-05-17 DIAGNOSIS — Z11.59 NEED FOR HEPATITIS C SCREENING TEST: ICD-10-CM

## 2023-05-17 DIAGNOSIS — Z01.818 PREOP EXAMINATION: Primary | ICD-10-CM

## 2023-05-17 LAB
ALBUMIN UR-MCNC: NEGATIVE MG/DL
APPEARANCE UR: CLEAR
BILIRUB UR QL STRIP: NEGATIVE
COLOR UR AUTO: NORMAL
CREAT UR-MCNC: 24.6 MG/DL
GLUCOSE UR STRIP-MCNC: NEGATIVE MG/DL
HBA1C MFR BLD: 6.4 %
HGB UR QL STRIP: NEGATIVE
KETONES UR STRIP-MCNC: NEGATIVE MG/DL
LEUKOCYTE ESTERASE UR QL STRIP: NEGATIVE
MICROALBUMIN UR-MCNC: <12 MG/L
MICROALBUMIN/CREAT UR: NORMAL MG/G{CREAT}
NITRATE UR QL: NEGATIVE
PH UR STRIP: 6.5 [PH] (ref 5–7)
RBC URINE: <1 /HPF
SP GR UR STRIP: 1 (ref 1–1.03)
TRANSITIONAL EPI: <1 /HPF
UROBILINOGEN UR STRIP-MCNC: NORMAL MG/DL
WBC URINE: 0 /HPF

## 2023-05-17 PROCEDURE — 99000 SPECIMEN HANDLING OFFICE-LAB: CPT | Performed by: PATHOLOGY

## 2023-05-17 PROCEDURE — 82570 ASSAY OF URINE CREATININE: CPT | Mod: 90 | Performed by: PATHOLOGY

## 2023-05-17 PROCEDURE — 82043 UR ALBUMIN QUANTITATIVE: CPT | Mod: 90 | Performed by: PATHOLOGY

## 2023-05-17 PROCEDURE — 81001 URINALYSIS AUTO W/SCOPE: CPT | Performed by: PATHOLOGY

## 2023-05-17 PROCEDURE — 87389 HIV-1 AG W/HIV-1&-2 AB AG IA: CPT | Mod: 90 | Performed by: PATHOLOGY

## 2023-05-17 PROCEDURE — 83036 HEMOGLOBIN GLYCOSYLATED A1C: CPT | Mod: 90 | Performed by: PATHOLOGY

## 2023-05-17 PROCEDURE — 99204 OFFICE O/P NEW MOD 45 MIN: CPT | Performed by: CLINICAL NURSE SPECIALIST

## 2023-05-17 PROCEDURE — 36415 COLL VENOUS BLD VENIPUNCTURE: CPT | Performed by: PATHOLOGY

## 2023-05-17 PROCEDURE — 86803 HEPATITIS C AB TEST: CPT | Mod: 90 | Performed by: PATHOLOGY

## 2023-05-17 ASSESSMENT — LIFESTYLE VARIABLES: TOBACCO_USE: 1

## 2023-05-17 ASSESSMENT — ENCOUNTER SYMPTOMS: DYSRHYTHMIAS: 0

## 2023-05-17 ASSESSMENT — PAIN SCALES - GENERAL: PAINLEVEL: NO PAIN (0)

## 2023-05-17 NOTE — PATIENT INSTRUCTIONS
Preparing for Your Surgery      Name:  John Simons   MRN:  [id number]   :  1965   Today's Date:  2023       Arriving for surgery:  Surgery date:  23  Arrival time:  07:00 am    Please come to:     [facility] [address]  [address] [place]  [address]    - ? parking is available in front of the hospital      -    Please proceed to [address] on the 3rd floor. [phone]?     - ?If you are in need of directions, wheelchair or escort please stop at the Information Desk in the lobby.  Inform the information person that you are here for surgery; a wheelchair and escort to [address] will be provided.?     What can I eat or drink?  -  You may eat and drink normally up to 8 hours prior to arrival time. (Until 11:00 pm the evening prior to your surgery)  -  You may have clear liquids until 2 hours prior to arrival time. (Until 05:00 am)    Examples of clear liquids:  Water  Clear broth  Juices (apple, white grape, white cranberry  and cider) without pulp  Noncarbonated, powder based beverages  (lemonade and Remberto-Aid)  Sodas (Sprite, 7-Up, ginger ale and seltzer)  Coffee or tea (without milk or cream)  Gatorade    -  No Alcohol for at least 24 hours before surgery.     Which medicines can I take?    Hold Aspirin for 7 days before surgery.   Hold Multivitamins for 7 days before surgery.  Hold Supplements for 7 days before surgery.  Hold Ibuprofen (Advil, Motrin) for 1 day(s) before surgery--unless otherwise directed by surgeon.  Hold Naproxen (Aleve) for 4 days before surgery.    -  DO NOT take these medications the day of surgery:  Lisinopril (Zestril)  Metformin (Glucophage)    -  PLEASE TAKE these medications the day of surgery:  Tylenol if needed  Amlodipine (Norvasc)  Atorvastatin (Lipitor)  Escitalopram (Lexapro)    How do I prepare myself?  - Please take 2 showers (one the night prior to surgery and one the morning of  surgery) using Scrubcare or Hibiclens soap.    Use this soap only from the neck to your toes.     Leave the soap on your skin for one minute--then rinse thoroughly.      You may use your own shampoo and conditioner. No other hair products.   - Please remove all jewelry and body piercings.  - No lotions, deodorants or fragrance.  - No makeup or fingernail polish.   - Bring your ID and insurance card.    -If you have a Deep Brain Stimulator, Spinal Cord Stimulator, or any Neuro Stimulator device---you must bring the remote control to the hospital.      ALL PATIENTS GOING HOME THE SAME DAY OF SURGERY ARE REQUIRED TO HAVE A RESPONSIBLE ADULT TO DRIVE AND BE IN ATTENDANCE WITH THEM FOR 24 HOURS FOLLOWING SURGERY.    Covid testing policy as of 12/06/2022  Your surgeon will notify and schedule you for a COVID test if one is needed before surgery--please direct any questions or COVID symptoms to your surgeon      Questions or Concerns:    - For any questions regarding the day of surgery or your hospital stay, please contact the Pre Admission Nursing Office at [phone].       - If you have health changes between today and your surgery, please call your surgeon.       - For questions after surgery, please call your surgeons office.           Current Visitor Guidelines       Surgeries and procedures: Adult patients can have 2 visitors all through the surgery process.     Visiting hours: 8 a.m. to 8:30 p.m.     Hospital: Adult patients and children under age 18 can have 4 visitor at a time       No visitors under the age of 5 are allowed for hospital patients.       Double occupancy rooms: Patients can have only two visitors at a time.       Patients confirmed or suspected to have symptoms of COVID 19 or flu:     No visitors allowed for adult patients.   Children (under age 18) can have 1 named visitor.     People who are sick or showing symptoms of COVID 19 or flu:    Are not allowed to visit patients--we can only make  exceptions in special situations.       Please follow these guidelines for your visit:          Please maintain social distance          Masking is optional--however at times you may be asked to wear a mask for the safety of yourself and others     Clean your hands with alcohol hand . Do this when you arrive at and leave the building and patient room,    And again after you touch your mask or anything in the room.     Go directly to and from the room you are visiting.     Stay in the patient s room during your visit. Limit going to other places in the hospital as much as possible     Leave bags and jackets at home or in the car.     For everyone s health, please don t come and go during your visit. That includes for smoking   during your visit.

## 2023-05-17 NOTE — H&P
Pre-Operative H & P     CC:  Preoperative exam to assess for increased cardiopulmonary risk while undergoing surgery and anesthesia.    Date of Encounter: 5/17/2023  Primary Care Physician:  Clinic, [facility]     Reason for visit:   Encounter Diagnoses   Name Primary?     Preop examination Yes     Right renal mass        HPI  John Simons is a 58 year old male who presents for pre-operative H & P in preparation for  Procedure Information     Case: [id number] Date/Time: 05/31/23 0900    Procedure: ANESTHESIA OUT OF OR COMPUTED TOMOGRAPHY RENAL CRYOABLATION@0900 (Kidney)    Anesthesia type: General    Diagnosis: Renal mass [N28.89]    Pre-op diagnosis: Renal mass [N28.89]    Location: [facility]    Providers: GENERIC ANESTHESIA PROVIDER        History is obtained from the patient and chart review    Patient who was being evaluated for an umbilical hernia and was found to have a right renal mass 2.6 cm on CT. He was then referred to Dr. Chao and counseled for above procedure.     His MRI also showed an indeterminate bulbous enlargement of the right adrenal gland. Surveillance planned.    His history is otherwise significant for obesity, HLD, HTN, smoking, CARLOS RF, DM, and depression.       Hx of abnormal bleeding or anti-platelet use: Denies      Past Medical History  Past Medical History:   Diagnosis Date     Depression      Diabetes mellitus, type 2 (H)      Right renal mass      Umbilical hernia without obstruction and without gangrene      Unspecified essential hypertension        Past Surgical History  Past Surgical History:   Procedure Laterality Date     BACK SURGERY  2021     HC REMOVE TONSILS/ADENOIDS,<10 Y/O  10/04/1968    Tonsillectomy and adenoidectomy < age 11       Prior to Admission Medications  Current Outpatient Medications   Medication Sig Dispense Refill     acetaminophen (TYLENOL) 500 MG tablet Take 1-2 tablets (500-1,000 mg) by mouth every 4 hours as needed for mild pain  30 tablet 0     amLODIPine (NORVASC) 10 MG tablet Take 1 tablet (10 mg) by mouth daily 90 tablet 1     atorvastatin (LIPITOR) 20 MG tablet Take 1 tablet (20 mg) by mouth daily 30 tablet 3     escitalopram (LEXAPRO) 20 MG tablet Take 1 tablet (20 mg) by mouth daily 90 tablet 1     lisinopril (ZESTRIL) 40 MG tablet Take 1 tablet (40 mg) by mouth daily 90 tablet 1     metFORMIN (GLUCOPHAGE) 500 MG tablet Take 1 tablet (500 mg) by mouth 2 times daily (with meals) 180 tablet 1       Allergies  Allergies   Allergen Reactions     Bupropion Anxiety     Behavioral Disturbances, Overwhelming       Social History  Social History     Socioeconomic History     Marital status:      Spouse name: Not on file     Number of children: Not on file     Years of education: Not on file     Highest education level: Not on file   Occupational History     Not on file   Tobacco Use     Smoking status: Some Days     Types: Cigarettes, Other     Start date: 2012     Smokeless tobacco: Never     Tobacco comments:     2 per day   Vaping Use     Vaping status: Never Used   Substance and Sexual Activity     Alcohol use: Not Currently     Alcohol/week: 2.0 standard drinks of alcohol     Types: 2 Cans of beer per week     Comment: o     Drug use: Never     Sexual activity: Not on file   Other Topics Concern     Not on file   Social History Narrative     Not on file     Social Determinants of Health     Financial Resource Strain: Not on file   Food Insecurity: Not on file   Transportation Needs: Not on file   Physical Activity: Not on file   Stress: Not on file   Social Connections: Not on file   Intimate Partner Violence: Not on file   Housing Stability: Not on file       Family History  Family History   Problem Relation Age of Onset     Heart Disease Paternal Grandfather         did not affect him until later years     Anesthesia Reaction No family hx of      Clotting Disorder No family hx of        Review of Systems  The complete review of  "systems is negative other than noted in the HPI or here.   Anesthesia Evaluation   Pt has had prior anesthetic. Type: General.    No history of anesthetic complications       ROS/MED HX  ENT/Pulmonary: Comment: Smokes 2 cigs daily    (+) CARLOS risk factors, snores loudly, hypertension, tobacco use, Current use,  (-) recent URI   Neurologic:    (-) no CVA and no TIA   Cardiovascular:     (+) Dyslipidemia hypertension-----Previous cardiac testing   Echo: Date: Results:    Stress Test: Date: Results:    ECG Reviewed: Date: Last 2006 Results:    Cath: Date: Results:   (-) taking anticoagulants/antiplatelets, LYMAN and arrhythmias   METS/Exercise Tolerance: >4 METS Comment: Works in [employer] and is very active through his work   Hematologic:    (-) history of blood clots and history of blood transfusion   Musculoskeletal: Comment: Back surgery 2021  (+) arthritis,     GI/Hepatic: Comment: Intermittent heartburn, prn TUMS  Umbilical hernia    (+) GERD, Other,     Renal/Genitourinary:    (-) renal disease   Endo:     (+) type II DM, Last HgA1c: 6.1, date: 2/15/23, Not using insulin, - not using insulin pump. Obesity,     Psychiatric/Substance Use:     (+) psychiatric history depression     Infectious Disease:  - neg infectious disease ROS     Malignancy: Comment: Renal mass      Other:  - neg other ROS          /74 (BP Location: Right arm, Patient Position: Sitting, Cuff Size: Adult Large)   Pulse 62   Temp 97.9  F (36.6  C) (Oral)   Resp 16   Ht 1.803 m (5' 11\")   Wt 112.9 kg (249 lb)   SpO2 99%   BMI 34.73 kg/m      Physical Exam   Constitutional: Awake, alert, cooperative, no apparent distress, and appears stated age.   Eyes: Pupils equal, round and reactive to light, extra ocular muscles intact, sclera clear, conjunctiva normal.  HENT: Normocephalic, oral pharynx with moist mucus membranes, good dentition. No goiter appreciated.   Respiratory: Clear to auscultation bilaterally, no crackles or " wheezing. No cough or obvious dyspnea.  Cardiovascular: Regular rate and rhythm, normal S1 and S2, and no murmur noted. Carotids +2, no bruits. No edema. Palpable pulses to radial  DP and PT arteries.   GI: Normal bowel sounds, soft, non-distended, non-tender, no masses palpated. Umbilical hernia-soft, nontender  Lymph/Hematologic: No cervical lymphadenopathy and no supraclavicular lymphadenopathy.  Genitourinary:  Deferred  Skin: Warm and dry.   Musculoskeletal: Full ROM of neck. There is no redness, warmth, or swelling of the joints. Gross motor strength is normal.    Neurologic: Awake, alert, oriented to name, place and time. Cranial nerves II-XII are grossly intact. Gait is mildly irregular  Neuropsychiatric: Calm, cooperative. Normal affect.     Prior Labs/Diagnostic Studies   All labs and imaging personally reviewed  Lab Results   Component Value Date    WBC 7.8 02/15/2023    WBC 8.6 09/10/2003     Lab Results   Component Value Date    RBC 4.86 02/15/2023    RBC 5.06 09/10/2003     Lab Results   Component Value Date    HGB 15.2 02/15/2023    HGB 16.2 04/04/2007     Lab Results   Component Value Date    HCT 45.6 02/15/2023    HCT 44.7 09/10/2003     Lab Results   Component Value Date    MCV 94 02/15/2023    MCV 88.3 09/10/2003     Lab Results   Component Value Date    MCH 31.3 02/15/2023    MCH 30.6 09/10/2003     Lab Results   Component Value Date    MCHC 33.3 02/15/2023    MCHC 34.7 09/10/2003     Lab Results   Component Value Date    RDW 12.8 02/15/2023    RDW 13.9 09/10/2003     Lab Results   Component Value Date     02/15/2023     09/10/2003     Last Comprehensive Metabolic Panel:  Sodium   Date Value Ref Range Status   02/15/2023 138 136 - 145 mmol/L Final   04/04/2007 142 133 - 144 mmol/L Final     Potassium   Date Value Ref Range Status   02/15/2023 5.1 3.4 - 5.3 mmol/L Final   04/04/2007 3.1 (L) 3.4 - 5.3 mmol/L Final     Chloride   Date Value Ref Range Status   02/15/2023 101 98 - 107  mmol/L Final   04/04/2007 97 94 - 109 mmol/L Final     Carbon Dioxide   Date Value Ref Range Status   04/04/2007 32 20 - 32 mmol/L Final     Carbon Dioxide (CO2)   Date Value Ref Range Status   02/15/2023 26 22 - 29 mmol/L Final     Anion Gap   Date Value Ref Range Status   02/15/2023 11 7 - 15 mmol/L Final   04/04/2007 13 6 - 17 mmol/L Final     Glucose   Date Value Ref Range Status   02/15/2023 131 (H) 70 - 99 mg/dL Final   04/04/2007 131 (H) 60 - 110 mg/dL Final     Urea Nitrogen   Date Value Ref Range Status   02/15/2023 17.4 6.0 - 20.0 mg/dL Final   04/04/2007 19 5 - 24 mg/dL Final     Creatinine   Date Value Ref Range Status   02/15/2023 0.95 0.67 - 1.17 mg/dL Final   04/04/2007 1.17 0.80 - 1.50 mg/dL Final     GFR Estimate   Date Value Ref Range Status   02/15/2023 >90 >60 mL/min/1.73m2 Final     Comment:     eGFR calculated using 2021 CKD-EPI equation.   04/04/2007 73 >60 mL/min/1.7m2 Final     Calcium   Date Value Ref Range Status   02/15/2023 9.6 8.6 - 10.0 mg/dL Final   04/04/2007 9.9 8.5 - 10.4 mg/dL Final     ALT   Date Value Ref Range Status   02/06/2004 58 0 - 65 U/L      AST   Date Value Ref Range Status   02/06/2004 29 0 - 37 U/L      EKG: None recent    3/13/23 US Renal                                                  IMPRESSION:  1.  Indeterminate circumscribed solid subcapsular mass posterolateral mid to upper right kidney is unchanged at 2.6 x 2.6 x 2.4 cm.  2.  Kidneys and bladder otherwise normal.    3/17/23 MR abdomen                            IMPRESSION: Arterially enhancing partially exophytic mass in the  mid-lower interpolar level at the posterior lateral position of the  right kidney corresponding with the recent ultrasound and CT  abnormalities concerning for renal cell carcinoma, particularly  possible clear cell subtype. This measures approximately 2.5 x 2.4 x  2.3 cm. Indeterminate bulbous enlargement of the right adrenal gland  but metastasis must be excluded until proven  "otherwise. Renal  vasculature and IVC appear unremarkable. There are also no enlarged  retroperitoneal or other lymph nodes.       The patient's records and results personally reviewed by this provider.     Outside records reviewed from: Care Everywhere    LAB/DIAGNOSTIC STUDIES TODAY:  UA ordered by Dr. Chao    Assessment      John Simons is a 58 year old male seen as a PAC referral for risk assessment and optimization for anesthesia.    Plan/Recommendations  Pt will be optimized for the proposed procedure.  See below for details on the assessment, risk, and preoperative recommendations    NEUROLOGY  - No history of TIA, CVA or seizure   - Lumbar spine surgery 2021, recovered    -Post Op delirium risk factors:  No risk identified    ENT  - No current airway concerns.  Will need to be reassessed day of surgery.  Mallampati: II  TM: > 3   Right upper front tooth with cap and some protrusion    CARDIAC  HLD. Will take atorvastatin on DOS. HYPERTENSION. Good control. Will take amlodipine and hold lisinopril on DOS. No other cardiac history, symptoms or meds. Good exercise tolerance primarily through his work in [employer].  - METS (Metabolic Equivalents)>4    RCRI: 0.4% risk of serious cardiac events    PULMONARY  Denies asthma, cough or shortness of breath  High risk for CARLOS    - Tobacco History      History   Smoking Status     Some Days     Types: Cigarettes, Other   Smokeless Tobacco     Never       GI: Occasional heartburn related to diet. TUMS prn but will hold on DOS  Umbilical hernia without obstruction   PONV Low Risk  Total Score: 0        /RENAL  - Baseline Creatinine 0.95    ENDOCRINE    - BMI: Estimated body mass index is 34.73 kg/m  as calculated from the following:    Height as of this encounter: 1.803 m (5' 11\").    Weight as of this encounter: 112.9 kg (249 lb).  Obesity (BMI >30)  DIABETES MELLITUS II. Last A1C 6.1. Will hold metformin on DOS.     HEME  VTE Low Risk 0.26%       "      Total Score: 0      Denies personal or family history of blood clots  Denies history of blood transfusion     MSK: OA    PSYCH  - Depression. Will take Lexapro on DOS.     Different anesthesia methods/types have been discussed with the patient, but they are aware that the final plan will be decided by the assigned anesthesia provider on the date of service.        The patient is optimized for their procedure. AVS with information on surgery time/arrival time, meds and NPO status given by nursing staff. No further diagnostic testing indicated.      On the day of service:     Prep time: 15 minutes  Visit time: 16 minutes  Documentation time: 19 minutes  ------------------------------------------  Total time: 50 minutes      NEFTALY Emmanuel CNS  Preoperative Assessment Center  [facility]  Phone: [phone]  Fax: [phone]

## 2023-05-18 LAB
HCV AB SERPL QL IA: NONREACTIVE
HIV 1+2 AB+HIV1 P24 AG SERPL QL IA: NONREACTIVE

## 2023-05-31 ENCOUNTER — APPOINTMENT (OUTPATIENT)
Dept: INTERVENTIONAL RADIOLOGY/VASCULAR | Facility: CLINIC | Age: 58
End: 2023-05-31
Attending: UROLOGY
Payer: COMMERCIAL

## 2023-05-31 ENCOUNTER — ANESTHESIA (OUTPATIENT)
Dept: SURGERY | Facility: CLINIC | Age: 58
End: 2023-05-31
Payer: COMMERCIAL

## 2023-05-31 ENCOUNTER — HOSPITAL ENCOUNTER (OUTPATIENT)
Facility: CLINIC | Age: 58
Discharge: HOME | End: 2023-05-31
Attending: UROLOGY | Admitting: RADIOLOGY
Payer: COMMERCIAL

## 2023-05-31 VITALS
WEIGHT: 243.17 LBS | OXYGEN SATURATION: 95 % | TEMPERATURE: 97.3 F | HEART RATE: 84 BPM | BODY MASS INDEX: 34.04 KG/M2 | HEIGHT: 71 IN | DIASTOLIC BLOOD PRESSURE: 97 MMHG | RESPIRATION RATE: 16 BRPM | SYSTOLIC BLOOD PRESSURE: 141 MMHG

## 2023-05-31 DIAGNOSIS — I10 ESSENTIAL HYPERTENSION, BENIGN: ICD-10-CM

## 2023-05-31 DIAGNOSIS — E11.9 TYPE 2 DIABETES MELLITUS WITHOUT COMPLICATION, WITHOUT LONG-TERM CURRENT USE OF INSULIN (H): ICD-10-CM

## 2023-05-31 DIAGNOSIS — N28.89 RIGHT RENAL MASS: Primary | ICD-10-CM

## 2023-05-31 LAB
ANION GAP SERPL CALCULATED.3IONS-SCNC: 12 MMOL/L (ref 7–15)
BUN SERPL-MCNC: 12.8 MG/DL (ref 8–23)
CALCIUM SERPL-MCNC: 9.1 MG/DL (ref 8.6–10)
CHLORIDE SERPL-SCNC: 100 MMOL/L (ref 98–107)
CREAT SERPL-MCNC: 0.82 MG/DL (ref 0.67–1.17)
DEPRECATED HCO3 PLAS-SCNC: 23 MMOL/L (ref 22–29)
ERYTHROCYTE [DISTWIDTH] IN BLOOD BY AUTOMATED COUNT: 13.2 % (ref 10–15)
GFR SERPL CREATININE-BSD FRML MDRD: >90 ML/MIN/1.73M2
GLUCOSE BLDC GLUCOMTR-MCNC: 124 MG/DL (ref 70–99)
GLUCOSE BLDC GLUCOMTR-MCNC: 155 MG/DL (ref 70–99)
GLUCOSE BLDC GLUCOMTR-MCNC: 168 MG/DL (ref 70–99)
GLUCOSE SERPL-MCNC: 110 MG/DL (ref 70–99)
HCT VFR BLD AUTO: 42.2 % (ref 40–53)
HGB BLD-MCNC: 14.3 G/DL (ref 13.3–17.7)
INR PPP: 0.98 (ref 0.85–1.15)
MCH RBC QN AUTO: 31.6 PG (ref 26.5–33)
MCHC RBC AUTO-ENTMCNC: 33.9 G/DL (ref 31.5–36.5)
MCV RBC AUTO: 93 FL (ref 78–100)
PLATELET # BLD AUTO: 186 10E3/UL (ref 150–450)
POTASSIUM SERPL-SCNC: 4.6 MMOL/L (ref 3.4–5.3)
RBC # BLD AUTO: 4.52 10E6/UL (ref 4.4–5.9)
SODIUM SERPL-SCNC: 135 MMOL/L (ref 136–145)
WBC # BLD AUTO: 7.3 10E3/UL (ref 4–11)

## 2023-05-31 PROCEDURE — 250N000011 HC RX IP 250 OP 636: Performed by: NURSE PRACTITIONER

## 2023-05-31 PROCEDURE — 272N000505 HC NEEDLE CR5

## 2023-05-31 PROCEDURE — 999N000141 HC STATISTIC PRE-PROCEDURE NURSING ASSESSMENT

## 2023-05-31 PROCEDURE — 50593 PERC CRYO ABLATE RENAL TUM: CPT

## 2023-05-31 PROCEDURE — 250N000025 HC SEVOFLURANE, PER MIN

## 2023-05-31 PROCEDURE — 88307 TISSUE EXAM BY PATHOLOGIST: CPT | Mod: 26 | Performed by: PATHOLOGY

## 2023-05-31 PROCEDURE — 85014 HEMATOCRIT: CPT | Performed by: NURSE PRACTITIONER

## 2023-05-31 PROCEDURE — C2618 PROBE/NEEDLE, CRYO: HCPCS

## 2023-05-31 PROCEDURE — 250N000013 HC RX MED GY IP 250 OP 250 PS 637: Performed by: ANESTHESIOLOGY

## 2023-05-31 PROCEDURE — 88342 IMHCHEM/IMCYTCHM 1ST ANTB: CPT | Mod: 26 | Performed by: PATHOLOGY

## 2023-05-31 PROCEDURE — 258N000003 HC RX IP 258 OP 636: Performed by: ANESTHESIOLOGY

## 2023-05-31 PROCEDURE — 250N000011 HC RX IP 250 OP 636: Performed by: ANESTHESIOLOGY

## 2023-05-31 PROCEDURE — 50593 PERC CRYO ABLATE RENAL TUM: CPT | Mod: GC | Performed by: RADIOLOGY

## 2023-05-31 PROCEDURE — 710N000012 HC RECOVERY PHASE 2, PER MINUTE

## 2023-05-31 PROCEDURE — 88341 IMHCHEM/IMCYTCHM EA ADD ANTB: CPT | Mod: 26 | Performed by: PATHOLOGY

## 2023-05-31 PROCEDURE — 80048 BASIC METABOLIC PNL TOTAL CA: CPT | Performed by: NURSE PRACTITIONER

## 2023-05-31 PROCEDURE — 88307 TISSUE EXAM BY PATHOLOGIST: CPT | Mod: TC | Performed by: UROLOGY

## 2023-05-31 PROCEDURE — 250N000009 HC RX 250: Performed by: ANESTHESIOLOGY

## 2023-05-31 PROCEDURE — 710N000010 HC RECOVERY PHASE 1, LEVEL 2, PER MIN

## 2023-05-31 PROCEDURE — 77013 CT GUIDE FOR TISSUE ABLATION: CPT | Mod: 26 | Performed by: RADIOLOGY

## 2023-05-31 PROCEDURE — 85610 PROTHROMBIN TIME: CPT | Performed by: NURSE PRACTITIONER

## 2023-05-31 PROCEDURE — 82962 GLUCOSE BLOOD TEST: CPT

## 2023-05-31 PROCEDURE — 370N000017 HC ANESTHESIA TECHNICAL FEE, PER MIN

## 2023-05-31 RX ORDER — ONDANSETRON 2 MG/ML
4 INJECTION INTRAMUSCULAR; INTRAVENOUS EVERY 30 MIN PRN
Status: DISCONTINUED | OUTPATIENT
Start: 2023-05-31 | End: 2023-05-31 | Stop reason: HOSPADM

## 2023-05-31 RX ORDER — SODIUM CHLORIDE 9 MG/ML
INJECTION, SOLUTION INTRAVENOUS CONTINUOUS
Status: DISCONTINUED | OUTPATIENT
Start: 2023-05-31 | End: 2023-05-31 | Stop reason: HOSPADM

## 2023-05-31 RX ORDER — FENTANYL CITRATE 50 UG/ML
50 INJECTION, SOLUTION INTRAMUSCULAR; INTRAVENOUS EVERY 5 MIN PRN
Status: DISCONTINUED | OUTPATIENT
Start: 2023-05-31 | End: 2023-05-31 | Stop reason: HOSPADM

## 2023-05-31 RX ORDER — ACETAMINOPHEN 325 MG/1
650 TABLET ORAL EVERY 4 HOURS PRN
Status: DISCONTINUED | OUTPATIENT
Start: 2023-05-31 | End: 2023-05-31 | Stop reason: HOSPADM

## 2023-05-31 RX ORDER — HYDROMORPHONE HCL IN WATER/PF 6 MG/30 ML
0.4 PATIENT CONTROLLED ANALGESIA SYRINGE INTRAVENOUS EVERY 5 MIN PRN
Status: DISCONTINUED | OUTPATIENT
Start: 2023-05-31 | End: 2023-05-31 | Stop reason: HOSPADM

## 2023-05-31 RX ORDER — SODIUM CHLORIDE, SODIUM LACTATE, POTASSIUM CHLORIDE, CALCIUM CHLORIDE 600; 310; 30; 20 MG/100ML; MG/100ML; MG/100ML; MG/100ML
INJECTION, SOLUTION INTRAVENOUS CONTINUOUS
Status: DISCONTINUED | OUTPATIENT
Start: 2023-05-31 | End: 2023-05-31 | Stop reason: HOSPADM

## 2023-05-31 RX ORDER — SODIUM CHLORIDE, SODIUM LACTATE, POTASSIUM CHLORIDE, CALCIUM CHLORIDE 600; 310; 30; 20 MG/100ML; MG/100ML; MG/100ML; MG/100ML
INJECTION, SOLUTION INTRAVENOUS CONTINUOUS PRN
Status: DISCONTINUED | OUTPATIENT
Start: 2023-05-31 | End: 2023-05-31

## 2023-05-31 RX ORDER — LIDOCAINE HYDROCHLORIDE 20 MG/ML
INJECTION, SOLUTION INFILTRATION; PERINEURAL PRN
Status: DISCONTINUED | OUTPATIENT
Start: 2023-05-31 | End: 2023-05-31

## 2023-05-31 RX ORDER — CEFAZOLIN SODIUM/WATER 2 G/20 ML
2 SYRINGE (ML) INTRAVENOUS
Status: COMPLETED | OUTPATIENT
Start: 2023-05-31 | End: 2023-05-31

## 2023-05-31 RX ORDER — ONDANSETRON 4 MG/1
4 TABLET, ORALLY DISINTEGRATING ORAL EVERY 30 MIN PRN
Status: DISCONTINUED | OUTPATIENT
Start: 2023-05-31 | End: 2023-05-31 | Stop reason: HOSPADM

## 2023-05-31 RX ORDER — KETOROLAC TROMETHAMINE 30 MG/ML
30 INJECTION, SOLUTION INTRAMUSCULAR; INTRAVENOUS ONCE
Status: DISCONTINUED | OUTPATIENT
Start: 2023-05-31 | End: 2023-05-31 | Stop reason: HOSPADM

## 2023-05-31 RX ORDER — FENTANYL CITRATE 50 UG/ML
25 INJECTION, SOLUTION INTRAMUSCULAR; INTRAVENOUS EVERY 5 MIN PRN
Status: DISCONTINUED | OUTPATIENT
Start: 2023-05-31 | End: 2023-05-31 | Stop reason: HOSPADM

## 2023-05-31 RX ORDER — ONDANSETRON 2 MG/ML
4 INJECTION INTRAMUSCULAR; INTRAVENOUS ONCE
Status: DISCONTINUED | OUTPATIENT
Start: 2023-05-31 | End: 2023-05-31 | Stop reason: HOSPADM

## 2023-05-31 RX ORDER — CEFAZOLIN SODIUM/WATER 2 G/20 ML
2 SYRINGE (ML) INTRAVENOUS SEE ADMIN INSTRUCTIONS
Status: DISCONTINUED | OUTPATIENT
Start: 2023-05-31 | End: 2023-05-31 | Stop reason: HOSPADM

## 2023-05-31 RX ORDER — KETOROLAC TROMETHAMINE 30 MG/ML
15 INJECTION, SOLUTION INTRAMUSCULAR; INTRAVENOUS
Status: DISCONTINUED | OUTPATIENT
Start: 2023-05-31 | End: 2023-05-31 | Stop reason: HOSPADM

## 2023-05-31 RX ORDER — PROPOFOL 10 MG/ML
INJECTION, EMULSION INTRAVENOUS PRN
Status: DISCONTINUED | OUTPATIENT
Start: 2023-05-31 | End: 2023-05-31

## 2023-05-31 RX ORDER — ACETAMINOPHEN 325 MG/1
975 TABLET ORAL ONCE
Status: COMPLETED | OUTPATIENT
Start: 2023-05-31 | End: 2023-05-31

## 2023-05-31 RX ORDER — HYDROMORPHONE HCL IN WATER/PF 6 MG/30 ML
0.2 PATIENT CONTROLLED ANALGESIA SYRINGE INTRAVENOUS EVERY 5 MIN PRN
Status: DISCONTINUED | OUTPATIENT
Start: 2023-05-31 | End: 2023-05-31 | Stop reason: HOSPADM

## 2023-05-31 RX ORDER — DEXAMETHASONE SODIUM PHOSPHATE 4 MG/ML
INJECTION, SOLUTION INTRA-ARTICULAR; INTRALESIONAL; INTRAMUSCULAR; INTRAVENOUS; SOFT TISSUE PRN
Status: DISCONTINUED | OUTPATIENT
Start: 2023-05-31 | End: 2023-05-31

## 2023-05-31 RX ORDER — FENTANYL CITRATE 50 UG/ML
INJECTION, SOLUTION INTRAMUSCULAR; INTRAVENOUS PRN
Status: DISCONTINUED | OUTPATIENT
Start: 2023-05-31 | End: 2023-05-31

## 2023-05-31 RX ADMIN — SODIUM CHLORIDE, POTASSIUM CHLORIDE, SODIUM LACTATE AND CALCIUM CHLORIDE: 600; 310; 30; 20 INJECTION, SOLUTION INTRAVENOUS at 08:38

## 2023-05-31 RX ADMIN — Medication 50 MG: at 08:49

## 2023-05-31 RX ADMIN — Medication 50 MG: at 08:46

## 2023-05-31 RX ADMIN — ACETAMINOPHEN 975 MG: 325 TABLET ORAL at 11:17

## 2023-05-31 RX ADMIN — MIDAZOLAM 1 MG: 1 INJECTION INTRAMUSCULAR; INTRAVENOUS at 08:35

## 2023-05-31 RX ADMIN — LIDOCAINE HYDROCHLORIDE 100 MG: 20 INJECTION, SOLUTION INFILTRATION; PERINEURAL at 08:45

## 2023-05-31 RX ADMIN — Medication 30 MG: at 09:30

## 2023-05-31 RX ADMIN — PROPOFOL 200 MG: 10 INJECTION, EMULSION INTRAVENOUS at 08:45

## 2023-05-31 RX ADMIN — Medication 20 MG: at 10:07

## 2023-05-31 RX ADMIN — FENTANYL CITRATE 100 MCG: 50 INJECTION, SOLUTION INTRAMUSCULAR; INTRAVENOUS at 08:45

## 2023-05-31 RX ADMIN — MIDAZOLAM 1 MG: 1 INJECTION INTRAMUSCULAR; INTRAVENOUS at 08:39

## 2023-05-31 RX ADMIN — HYDROMORPHONE HYDROCHLORIDE 0.5 MG: 1 INJECTION, SOLUTION INTRAMUSCULAR; INTRAVENOUS; SUBCUTANEOUS at 10:36

## 2023-05-31 RX ADMIN — DEXAMETHASONE SODIUM PHOSPHATE 4 MG: 4 INJECTION, SOLUTION INTRA-ARTICULAR; INTRALESIONAL; INTRAMUSCULAR; INTRAVENOUS; SOFT TISSUE at 09:04

## 2023-05-31 RX ADMIN — Medication 2 G: at 08:58

## 2023-05-31 RX ADMIN — SUGAMMADEX 200 MG: 100 INJECTION, SOLUTION INTRAVENOUS at 10:44

## 2023-05-31 ASSESSMENT — ACTIVITIES OF DAILY LIVING (ADL)
ADLS_ACUITY_SCORE: 35

## 2023-05-31 NOTE — ANESTHESIA PREPROCEDURE EVALUATION
Anesthesia Pre-Procedure Evaluation    Patient: John Simons   MRN: [id number] : 1965        Procedure : Procedure(s):  ANESTHESIA OUT OF OR COMPUTED TOMOGRAPHY RENAL CRYOABLATION@0900          Past Medical History:   Diagnosis Date     Depression      Diabetes mellitus, type 2 (H)      Right renal mass      Umbilical hernia without obstruction and without gangrene      Unspecified essential hypertension       Past Surgical History:   Procedure Laterality Date     BACK SURGERY       HC REMOVE TONSILS/ADENOIDS,<10 Y/O  10/04/1968    Tonsillectomy and adenoidectomy < age 11      Allergies   Allergen Reactions     Bupropion Anxiety     Behavioral Disturbances, Overwhelming      Social History     Tobacco Use     Smoking status: Some Days     Types: Cigarettes, Other     Start date:      Smokeless tobacco: Never     Tobacco comments:     2 per day   Vaping Use     Vaping status: Never Used   Substance Use Topics     Alcohol use: Not Currently     Alcohol/week: 2.0 standard drinks of alcohol     Types: 2 Cans of beer per week     Comment: o      Wt Readings from Last 1 Encounters:   23 110.3 kg (243 lb 2.7 oz)        Anesthesia Evaluation   Pt has had prior anesthetic. Type: General.        ROS/MED HX  ENT/Pulmonary:  - neg pulmonary ROS     Neurologic:  - neg neurologic ROS     Cardiovascular:     (+) hypertension-----    METS/Exercise Tolerance:     Hematologic: Comments: Lab Test        02/15/23                       0816          WBC          7.8           HGB          15.2          MCV          94            PLT          192            Lab Test        02/15/23                       0816          NA           138           POTASSIUM    5.1           CHLORIDE     101           CO2          26            BUN          17.4          CR           0.95          ANIONGAP     11            HADLEY          9.6           GLC          131*                Musculoskeletal:  - neg musculoskeletal ROS      GI/Hepatic:  - neg GI/hepatic ROS     Renal/Genitourinary: Comment: Renal mass - neg Renal ROS     Endo:     (+) type II DM, Not using insulin, - not using insulin pump.     Psychiatric/Substance Use:     (+) psychiatric history depression     Infectious Disease:  - neg infectious disease ROS     Malignancy:  - neg malignancy ROS     Other:  - neg other ROS          Physical Exam    Airway        Mallampati: II   TM distance: > 3 FB   Neck ROM: full   Mouth opening: > 3 cm    Respiratory Devices and Support         Dental       (+) Modest Abnormalities - crowns, retainers, 1 or 2 missing teeth      Cardiovascular   cardiovascular exam normal          Pulmonary   pulmonary exam normal                OUTSIDE LABS:  CBC:   Lab Results   Component Value Date    WBC 7.8 02/15/2023    WBC 8.6 09/10/2003    HGB 15.2 02/15/2023    HGB 16.2 04/04/2007    HCT 45.6 02/15/2023    HCT 44.7 09/10/2003     02/15/2023     09/10/2003     BMP:   Lab Results   Component Value Date     02/15/2023     04/04/2007    POTASSIUM 5.1 02/15/2023    POTASSIUM 3.1 (L) 04/04/2007    CHLORIDE 101 02/15/2023    CHLORIDE 97 04/04/2007    CO2 26 02/15/2023    CO2 32 04/04/2007    BUN 17.4 02/15/2023    BUN 19 04/04/2007    CR 0.95 02/15/2023    CR 1.17 04/04/2007     (H) 02/15/2023     (H) 04/04/2007     COAGS: No results found for: PTT, INR, FIBR  POC: No results found for: BGM, HCG, HCGS  HEPATIC:   Lab Results   Component Value Date    ALT 58 02/06/2004    AST 29 02/06/2004     OTHER:   Lab Results   Component Value Date    A1C 6.4 (H) 05/17/2023    HADLEY 9.6 02/15/2023    TSH 2.40 01/04/2005    SED 3 09/10/2003       Anesthesia Plan    ASA Status:  3      Anesthesia Type: General.     - Airway: ETT   Induction: Intravenous, Propofol.   Maintenance: Balanced.        Consents    Anesthesia Plan(s) and associated risks, benefits, and realistic alternatives discussed. Questions answered and  patient/representative(s) expressed understanding.    - Discussed:     - Discussed with:  Patient      - Extended Intubation/Ventilatory Support Discussed: No.      - Patient is DNR/DNI Status: No    Use of blood products discussed: No .     Postoperative Care    Pain management: IV analgesics.   PONV prophylaxis: Ondansetron (or other 5HT-3), Dexamethasone or Solumedrol     Comments:                Codey Meneses MD

## 2023-05-31 NOTE — ANESTHESIA PROCEDURE NOTES
Airway       Patient location during procedure: OR  Staff -        CRNA: Jabier Harrell APRN CRNA       Performed By: CRNA  Consent for Airway        Urgency: elective  Indications and Patient Condition       Indications for airway management: teri-procedural       Induction type:intravenous       Mask difficulty assessment: 2 - vent by mask + OA or adjuvant +/- NMBA    Final Airway Details       Final airway type: endotracheal airway       Successful airway: ETT - single and Oral  Endotracheal Airway Details        ETT size (mm): 7.5       Cuffed: yes       Successful intubation technique: direct laryngoscopy       DL Blade Type: Anna 2       Grade View of Cords: 1       Adjucts: stylet       Position: Right       Measured from: gums/teeth       Secured at (cm): 24       Bite block used: Oral Airway (At end of case)    Post intubation assessment        Placement verified by: capnometry, equal breath sounds and chest rise        Number of attempts at approach: 1       Number of other approaches attempted: 0       Secured with: pink tape (tegaderm)       Ease of procedure: easy       Dentition: Intact and Unchanged

## 2023-05-31 NOTE — IR NOTE
Patient Name: John Simons  Medical Record Number: [id number]  Today's Date: 5/31/2023    Start Time: 9:46 AM   End of procedure time: 10:35 AM   Procedure CT guided renal cryoablation   Proceduralist: Dr Kim Washington    Sedation   Sedation medication administered: managed by anesthesia team       Report given to: alyssa SARGENT PACU  Time pt departs:  10:50 AM   :     R renal biopsy samples collected and sent     Other Notes: Pt arrived to IR room 0905 from pacu. Pt denies any questions or concerns regarding procedure. Pt positioned prone and monitored per protocol. Pt tolerated procedure without any complication. Pt transferred back to pacu.    Crystal Robison. ARIE

## 2023-05-31 NOTE — DISCHARGE INSTRUCTIONS
1. No heavy lifting for 2 days (no greater than 10 pounds)    2. Resume usual diet    3. Can shower tomorrow    4. Dressing can come off at time of next shower    5. Pain killers as needed    6. Interventional Radiology will call you and arrange your follow up    [facility]  Same-Day Surgery   Adult Discharge Orders & Instructions     For 24 hours after surgery    Get plenty of rest.  A responsible adult must stay with you for at least 24 hours after you leave the hospital.   Do not drive or use heavy equipment.  If you have weakness or tingling, don't drive or use heavy equipment until this feeling goes away.  Do not drink alcohol.  Avoid strenuous or risky activities.  Ask for help when climbing stairs.   You may feel lightheaded.  IF so, sit for a few minutes before standing.  Have someone help you get up.   If you have nausea (feel sick to your stomach): Drink only clear liquids such as apple juice, ginger ale, broth or 7-Up.  Rest may also help.  Be sure to drink enough fluids.  Move to a regular diet as you feel able.  You may have a slight fever. Call the doctor if your fever is over 100 F (37.7 C) (taken under the tongue) or lasts longer than 24 hours.  You may have a dry mouth, a sore throat, muscle aches or trouble sleeping.  These should go away after 24 hours.  Do not make important or legal decisions.   Call your doctor for any of the followin.  Signs of infection (fever, growing tenderness at the surgery site, a large amount of drainage or bleeding, severe pain, foul-smelling drainage, redness, swelling).    2. It has been over 8 to 10 hours since surgery and you are still not able to urinate (pass water).    3.  Headache for over 24 hours.    4.  Numbness, tingling or weakness the day after surgery (if you had spinal anesthesia).  To contact a doctor, call ________________________________________ or:    ' [phone] and ask for the resident on call  for   ______________________________________________ (answered 24 hours a day)  '   Emergency Department:    [facility]: [phone]       (TTY for hearing impaired: [phone])

## 2023-05-31 NOTE — ANESTHESIA POSTPROCEDURE EVALUATION
Patient: John Simons    Procedure: Procedure(s):  ANESTHESIA OUT OF OR COMPUTED TOMOGRAPHY RENAL CRYOABLATION@0900       Anesthesia Type:  General    Note:  Disposition: Outpatient   Postop Pain Control: Uneventful            Sign Out: Well controlled pain   PONV: No   Neuro/Psych: Uneventful            Sign Out: Acceptable/Baseline neuro status   Airway/Respiratory: Uneventful            Sign Out: Acceptable/Baseline resp. status   CV/Hemodynamics: Uneventful            Sign Out: Acceptable CV status; No obvious hypovolemia; No obvious fluid overload   Other NRE: NONE   DID A NON-ROUTINE EVENT OCCUR? No           Last vitals:  Vitals Value Taken Time   /97 05/31/23 1100   Temp 36.3  C (97.3  F) 05/31/23 1053   Pulse 92 05/31/23 1103   Resp 10 05/31/23 1101   SpO2 97 % 05/31/23 1103   Vitals shown include unvalidated device data.    Electronically Signed By: Codey Meneses MD  May 31, 2023  11:04 AM

## 2023-05-31 NOTE — PROCEDURES
[facility]    Procedure: Right renal mass cryoablation    Date/Time: 5/31/2023 10:36 AM    Performed by: Frederic Alvarez  Authorized by: Frederic Alvarez  IR Fellow Physician: Kim  Other(s) attending procedure: Alden (IR Staff), Jeremie (urology staff)      UNIVERSAL PROTOCOL   Site Marked: NA  Prior Images Obtained and Reviewed:  Yes  Required items: Required blood products, implants, devices and special equipment available    Patient identity confirmed:  Verbally with patient, arm band, provided demographic data and hospital-assigned identification number  Patient was reevaluated immediately before administering moderate or deep sedation or anesthesia  Confirmation Checklist:  Patient's identity using two indicators, relevant allergies, procedure was appropriate and matched the consent or emergent situation and correct equipment/implants were available  Time out: Immediately prior to the procedure a time out was called    Universal Protocol: the Joint Commission Universal Protocol was followed    Preparation: Patient was prepped and draped in usual sterile fashion       ANESTHESIA    Anesthesia: Local infiltration  Local Anesthetic:  Lidocaine 1% without epinephrine      SEDATION    Patient Sedated: No    Sedation Type:  Moderate (conscious) sedation  Sedation:  Fentanyl and midazolam  See dictated procedure note for full details.  Findings: Right renal mass    Specimens: core needle biopsy specimens sent for pathological analysis    Complications: None    Condition: Stable    Plan: Bedrest 3 hours. Follow up in urology clinic in 4-6 weeks with abdominal MRI.       PROCEDURE  Describe Procedure: Successful right renal mass cryoablation and biopsy. 3 core biopsy samples submitted in formalin.   Patient Tolerance:  Patient tolerated the procedure well with no immediate complications  Length of time physician/provider present for 1:1 monitoring during sedation: 0

## 2023-05-31 NOTE — ANESTHESIA CARE TRANSFER NOTE
Patient: John Simons    Procedure: Procedure(s):  ANESTHESIA OUT OF OR COMPUTED TOMOGRAPHY RENAL CRYOABLATION@0900       Diagnosis: Renal mass [N28.89]  Diagnosis Additional Information: No value filed.    Anesthesia Type:   General     Note:    Oropharynx: oropharynx clear of all foreign objects and spontaneously breathing  Level of Consciousness: awake  Oxygen Supplementation: nasal cannula  Level of Supplemental Oxygen (L/min / FiO2): 3  Independent Airway: airway patency satisfactory and stable  Dentition: dentition unchanged  Vital Signs Stable: post-procedure vital signs reviewed and stable  Report to RN Given: handoff report given  Patient transferred to: PACU  Comments: PACU RN to obtain orders for PRN anti-HTN meds.   Handoff Report: Identifed the Patient, Identified the Reponsible Provider, Reviewed the pertinent medical history, Discussed the surgical course, Reviewed Intra-OP anesthesia mangement and issues during anesthesia, Set expectations for post-procedure period and Allowed opportunity for questions and acknowledgement of understanding      Vitals:  Vitals Value Taken Time   /102 05/31/23 1054   Temp 36.3C    Pulse 94 05/31/23 1100   Resp 14 05/31/23 1100   SpO2 97 % 05/31/23 1100   Vitals shown include unvalidated device data.    Electronically Signed By: NEFTALY Burt CRNA  May 31, 2023  11:02 AM

## 2023-06-01 ENCOUNTER — HEALTH MAINTENANCE LETTER (OUTPATIENT)
Age: 58
End: 2023-06-01

## 2023-06-02 LAB
PATH REPORT.COMMENTS IMP SPEC: ABNORMAL
PATH REPORT.COMMENTS IMP SPEC: YES
PATH REPORT.FINAL DX SPEC: ABNORMAL
PATH REPORT.GROSS SPEC: ABNORMAL
PATH REPORT.MICROSCOPIC SPEC OTHER STN: ABNORMAL
PATH REPORT.MICROSCOPIC SPEC OTHER STN: ABNORMAL
PATH REPORT.RELEVANT HX SPEC: ABNORMAL
PHOTO IMAGE: ABNORMAL

## 2023-07-03 ENCOUNTER — PRE VISIT (OUTPATIENT)
Dept: UROLOGY | Facility: CLINIC | Age: 58
End: 2023-07-03
Payer: COMMERCIAL

## 2023-07-03 NOTE — TELEPHONE ENCOUNTER
Reason for Visit: Post-op with imaging review     Diagnosis: Right renal mass    Rooming Requirements: Normal      Kelly Sauer  07/03/23  2:43 PM

## 2023-07-14 ENCOUNTER — TELEPHONE (OUTPATIENT)
Dept: UROLOGY | Facility: CLINIC | Age: 58
End: 2023-07-14

## 2023-07-14 NOTE — TELEPHONE ENCOUNTER
[facility] Call Center    Phone Message    May a detailed message be left on voicemail: no     Reason for Call: Other: Patient is calling to let us know that he will not be able to make post op appointment today at 9:20. Please call patient back to reschedule his appointment.     Action Taken: Message routed to:  [facility] ([facility] Urology    Travel Screening: Not Applicable

## 2023-07-18 NOTE — TELEPHONE ENCOUNTER
Left message in regards to scheduled appt time per roya. Advise pt needs imaging done prior to appt and to call to assist in scheduling [phone]

## 2023-08-18 ENCOUNTER — VIRTUAL VISIT (OUTPATIENT)
Dept: UROLOGY | Facility: CLINIC | Age: 58
End: 2023-08-18
Payer: COMMERCIAL

## 2023-08-18 ENCOUNTER — ANCILLARY PROCEDURE (OUTPATIENT)
Dept: CT IMAGING | Facility: CLINIC | Age: 58
End: 2023-08-18
Attending: UROLOGY
Payer: COMMERCIAL

## 2023-08-18 DIAGNOSIS — N28.89 RIGHT RENAL MASS: ICD-10-CM

## 2023-08-18 DIAGNOSIS — N28.89 RIGHT RENAL MASS: Primary | ICD-10-CM

## 2023-08-18 LAB
CREAT BLD-MCNC: 1 MG/DL (ref 0.7–1.3)
GFR SERPL CREATININE-BSD FRML MDRD: >60 ML/MIN/1.73M2

## 2023-08-18 PROCEDURE — 99213 OFFICE O/P EST LOW 20 MIN: CPT | Mod: VID | Performed by: UROLOGY

## 2023-08-18 PROCEDURE — 82565 ASSAY OF CREATININE: CPT | Performed by: PATHOLOGY

## 2023-08-18 PROCEDURE — 74178 CT ABD&PLV WO CNTR FLWD CNTR: CPT | Performed by: RADIOLOGY

## 2023-08-18 RX ORDER — IOPAMIDOL 755 MG/ML
119 INJECTION, SOLUTION INTRAVASCULAR ONCE
Status: COMPLETED | OUTPATIENT
Start: 2023-08-18 | End: 2023-08-18

## 2023-08-18 RX ADMIN — IOPAMIDOL 119 ML: 755 INJECTION, SOLUTION INTRAVASCULAR at 10:05

## 2023-08-18 ASSESSMENT — PAIN SCALES - GENERAL: PAINLEVEL: NO PAIN (0)

## 2023-08-18 NOTE — PROGRESS NOTES
Urology Clinic  URSULA Chao MD  Jul 14, 2023  57 year old male    ASSESSMENT AND PLAN    Renal cell carcinoma.  Right renal mass.  Interpolar.  Posterior Exophytic  2/15/23 CT shows 2.6cm mass.  There is no non contrast scan so I can't comment on enhancement.  5/31/23 Right percutaneous cryoablation.   Interpolar.  Posterior Exophytic.  Biopsy showed clear cell renal cell carcinoma.    Umbilical Hernia  2/15/23 CT shows the hernia    Right adrenal mass  3/17/23 MRI 1cm     The following are complicating factors.  These increase the risk of perioperative complications and make treatment more complicated.  Diabetes mellitus Type 2  Depression    Plan  Repeat CT in 6 months.    Addendum:  I reviewed his CT scans with Radiology.  They were very comfortable that the right adrenal mass seen on the scans is a benign lesion and no additional imaging is needed other than follow-up for his renal cell carcinoma in his kidney.  _______________________________________________________________________    HPI   He is here for follow-up CT after percutaneous  cryoablation.  He is doing well    8/18/23  I reviewed the radiologic images and report from this radiologic exam.  My independent interpretation is:  Good ablation zone in right posterior kidney.    This visit was done via phone at the patient's request.  Time spent on phone call: 6 min  Time spent on pre-visit work, post visit work, and documentation outside of phone call time on day of visit: 0 min  Total time: 6 min    ben would not connect us.        CC  Patient Care Team:  [facility] as PCP - Alina Blackburn APRN CNP as Assigned PCP  Iker Alas MD as Assigned Surgical Provider      Copy to patient  NORA DENNY  [address] [place] [id number]  This patient was a no show for this scheduled appointment.  
"Virtual Visit Details    Type of service:  Video Visit     Originating Location (pt. Location): {video visit patient location:081874::\"Home\"}  {PROVIDER LOCATION On-site should be selected for visits conducted from your clinic location or adjoining [facility] hospital, academic office, or other nearby [facility] building. Off-site should be selected for all other provider locations, including home:[id number]}  Distant Location (provider location):  {virtual location provider:328551}  Platform used for Video Visit: {Virtual Visit Platforms:580162::\"Alltuition\"}  "
Statement Selected

## 2023-08-18 NOTE — NURSING NOTE
Is the patient currently in the state of [place]? YES    Visit mode:VIDEO    If the visit is dropped, the patient can be reconnected by: VIDEO VISIT: Text to cell phone:   Telephone Information:   Mobile [phone]       Will anyone else be joining the visit? NO  (If patient encounters technical issues they should call [phone]    How would you like to obtain your AVS? MyChart    Are changes needed to the allergy or medication list? No    Reason for visit: Follow Up    Nakia RIOJAS

## 2023-08-18 NOTE — LETTER
8/18/2023       RE: Nora Simons  [address] [facility] [id number]     Dear Colleague,    Thank you for referring your patient, Nora Simons, to the [facility] [place] [facility]. Please see a copy of my visit note below.      Urology Clinic  URSULA Chao MD  Jul 14, 2023  57 year old male    ASSESSMENT AND PLAN    Renal cell carcinoma.  Right renal mass.  Interpolar.  Posterior Exophytic  2/15/23 CT shows 2.6cm mass.  There is no non contrast scan so I can't comment on enhancement.  5/31/23 Right percutaneous cryoablation.   Interpolar.  Posterior Exophytic.  Biopsy showed clear cell renal cell carcinoma.    Umbilical Hernia  2/15/23 CT shows the hernia    Right adrenal mass  3/17/23 MRI 1cm     The following are complicating factors.  These increase the risk of perioperative complications and make treatment more complicated.  Diabetes mellitus Type 2  Depression    Plan  Repeat CT in 6 months.  _______________________________________________________________________    HPI   He is here for follow-up CT after percutaneous  cryoablation.  He is doing well    8/18/23  I reviewed the radiologic images and report from this radiologic exam.  My independent interpretation is:  Good ablation zone in right posterior kidney.    This visit was done via phone at the patient's request.  Time spent on phone call: 6 min  Time spent on pre-visit work, post visit work, and documentation outside of phone call time on day of visit: 0 min  Total time: 6 min    [facility]well would not connect us.        CC  Patient Care Team:  [facility] as PCP - Alina Blackburn APRN CNP as Assigned PCP  Iker Alas MD as Assigned Surgical Provider      Copy to patient  NORA SIMONS  [address] [facility] [phone]  This patient was a no show for this scheduled appointment.      Again, thank you for  allowing me to participate in the care of your patient.      Sincerely,    Ron Chao MD

## 2023-08-18 NOTE — DISCHARGE INSTRUCTIONS

## 2023-08-25 DIAGNOSIS — F33.41 MAJOR DEPRESSIVE DISORDER, RECURRENT EPISODE, IN PARTIAL REMISSION (H): ICD-10-CM

## 2023-08-25 DIAGNOSIS — I10 ESSENTIAL HYPERTENSION, BENIGN: Primary | ICD-10-CM

## 2023-08-28 ENCOUNTER — TELEPHONE (OUTPATIENT)
Dept: FAMILY MEDICINE | Facility: CLINIC | Age: 58
End: 2023-08-28

## 2023-08-28 RX ORDER — LISINOPRIL 40 MG/1
40 TABLET ORAL DAILY
Qty: 90 TABLET | Refills: 0 | Status: SHIPPED | OUTPATIENT
Start: 2023-08-28 | End: 2023-09-29

## 2023-08-28 RX ORDER — ESCITALOPRAM OXALATE 20 MG/1
20 TABLET ORAL DAILY
Qty: 90 TABLET | Refills: 0 | Status: SHIPPED | OUTPATIENT
Start: 2023-08-28 | End: 2023-09-29

## 2023-09-10 ENCOUNTER — HEALTH MAINTENANCE LETTER (OUTPATIENT)
Age: 58
End: 2023-09-10

## 2023-09-29 ENCOUNTER — OFFICE VISIT (OUTPATIENT)
Dept: FAMILY MEDICINE | Facility: CLINIC | Age: 58
End: 2023-09-29
Payer: COMMERCIAL

## 2023-09-29 VITALS
HEART RATE: 75 BPM | TEMPERATURE: 98 F | BODY MASS INDEX: 34.72 KG/M2 | HEIGHT: 71 IN | OXYGEN SATURATION: 97 % | RESPIRATION RATE: 14 BRPM | SYSTOLIC BLOOD PRESSURE: 126 MMHG | WEIGHT: 248 LBS | DIASTOLIC BLOOD PRESSURE: 77 MMHG

## 2023-09-29 DIAGNOSIS — F33.41 MAJOR DEPRESSIVE DISORDER, RECURRENT EPISODE, IN PARTIAL REMISSION (H): ICD-10-CM

## 2023-09-29 DIAGNOSIS — C64.1 CLEAR CELL RENAL CELL CARCINOMA, RIGHT (H): ICD-10-CM

## 2023-09-29 DIAGNOSIS — Z12.12 SCREENING FOR COLORECTAL CANCER: ICD-10-CM

## 2023-09-29 DIAGNOSIS — Z00.00 HEALTHCARE MAINTENANCE: ICD-10-CM

## 2023-09-29 DIAGNOSIS — Z12.11 SCREENING FOR COLORECTAL CANCER: ICD-10-CM

## 2023-09-29 DIAGNOSIS — Z12.11 SCREEN FOR COLON CANCER: Primary | ICD-10-CM

## 2023-09-29 DIAGNOSIS — E11.9 TYPE 2 DIABETES MELLITUS WITHOUT COMPLICATION, WITHOUT LONG-TERM CURRENT USE OF INSULIN (H): ICD-10-CM

## 2023-09-29 DIAGNOSIS — F17.200 NICOTINE DEPENDENCE, UNCOMPLICATED, UNSPECIFIED NICOTINE PRODUCT TYPE: ICD-10-CM

## 2023-09-29 DIAGNOSIS — E78.00 HYPERCHOLESTEREMIA: ICD-10-CM

## 2023-09-29 DIAGNOSIS — I10 ESSENTIAL HYPERTENSION, BENIGN: ICD-10-CM

## 2023-09-29 LAB
ANION GAP SERPL CALCULATED.3IONS-SCNC: 11 MMOL/L (ref 7–15)
BUN SERPL-MCNC: 19.3 MG/DL (ref 8–23)
CALCIUM SERPL-MCNC: 9.7 MG/DL (ref 8.6–10)
CHLORIDE SERPL-SCNC: 99 MMOL/L (ref 98–107)
CREAT SERPL-MCNC: 1 MG/DL (ref 0.67–1.17)
DEPRECATED HCO3 PLAS-SCNC: 25 MMOL/L (ref 22–29)
EGFRCR SERPLBLD CKD-EPI 2021: 87 ML/MIN/1.73M2
GLUCOSE SERPL-MCNC: 117 MG/DL (ref 70–99)
HBA1C MFR BLD: 6.4 % (ref 0–5.6)
POTASSIUM SERPL-SCNC: 4.7 MMOL/L (ref 3.4–5.3)
SODIUM SERPL-SCNC: 135 MMOL/L (ref 135–145)

## 2023-09-29 PROCEDURE — 90715 TDAP VACCINE 7 YRS/> IM: CPT | Performed by: INTERNAL MEDICINE

## 2023-09-29 PROCEDURE — 36415 COLL VENOUS BLD VENIPUNCTURE: CPT | Performed by: INTERNAL MEDICINE

## 2023-09-29 PROCEDURE — 80048 BASIC METABOLIC PNL TOTAL CA: CPT | Performed by: INTERNAL MEDICINE

## 2023-09-29 PROCEDURE — 83036 HEMOGLOBIN GLYCOSYLATED A1C: CPT | Performed by: INTERNAL MEDICINE

## 2023-09-29 PROCEDURE — 90471 IMMUNIZATION ADMIN: CPT | Performed by: INTERNAL MEDICINE

## 2023-09-29 PROCEDURE — 99214 OFFICE O/P EST MOD 30 MIN: CPT | Mod: 25 | Performed by: INTERNAL MEDICINE

## 2023-09-29 RX ORDER — LISINOPRIL 40 MG/1
40 TABLET ORAL DAILY
Qty: 90 TABLET | Refills: 3 | Status: SHIPPED | OUTPATIENT
Start: 2023-09-29

## 2023-09-29 RX ORDER — ACETAMINOPHEN 500 MG
500-1000 TABLET ORAL EVERY 4 HOURS PRN
Qty: 30 TABLET | Refills: 0 | Status: SHIPPED | OUTPATIENT
Start: 2023-09-29

## 2023-09-29 RX ORDER — ESCITALOPRAM OXALATE 20 MG/1
20 TABLET ORAL DAILY
Qty: 90 TABLET | Refills: 3 | Status: SHIPPED | OUTPATIENT
Start: 2023-09-29

## 2023-09-29 RX ORDER — AMLODIPINE BESYLATE 10 MG/1
10 TABLET ORAL DAILY
Qty: 90 TABLET | Refills: 3 | Status: SHIPPED | OUTPATIENT
Start: 2023-09-29

## 2023-09-29 RX ORDER — ATORVASTATIN CALCIUM 20 MG/1
20 TABLET, FILM COATED ORAL DAILY
Qty: 30 TABLET | Refills: 3 | Status: SHIPPED | OUTPATIENT
Start: 2023-09-29

## 2023-09-29 ASSESSMENT — PATIENT HEALTH QUESTIONNAIRE - PHQ9
10. IF YOU CHECKED OFF ANY PROBLEMS, HOW DIFFICULT HAVE THESE PROBLEMS MADE IT FOR YOU TO DO YOUR WORK, TAKE CARE OF THINGS AT HOME, OR GET ALONG WITH OTHER PEOPLE: NOT DIFFICULT AT ALL
SUM OF ALL RESPONSES TO PHQ QUESTIONS 1-9: 2
SUM OF ALL RESPONSES TO PHQ QUESTIONS 1-9: 2

## 2023-09-29 NOTE — ASSESSMENT & PLAN NOTE
Diagnosed in 2/2023 when seen on imaging  - 5/31/2023: Localized cryoablation  Follows with [facility] urology

## 2023-09-29 NOTE — ASSESSMENT & PLAN NOTE
CRC: Arrange for colonoscopy  Continued cigarette use -using nicotine replacement which has helped him cut back his frequency of cigarettes  Discussed adult vaccinations updated accordingly

## 2023-09-29 NOTE — ASSESSMENT & PLAN NOTE
controlled  current antihypertensive regimen: amlodipine 10mg daily, lisinopril 40mg daily  regimen changes:   intolerance:   future titration/work-up plan:

## 2023-09-29 NOTE — PROGRESS NOTES
"  Assessment & Plan   Problem List Items Addressed This Visit          Endocrine    Type 2 diabetes mellitus without complication, without long-term current use of insulin (H)     hypoglycemic medications:  metformin 500mg BID  insulin therapy:   last HbA1c: 6.4%  microvascular complications:   macrovascular complications:   ASA/EREN/statin:          Relevant Medications    metFORMIN (GLUCOPHAGE) 500 MG tablet    Other Relevant Orders    HEMOGLOBIN A1C    Basic metabolic panel       Circulatory    Essential hypertension, benign     controlled  current antihypertensive regimen: amlodipine 10mg daily, lisinopril 40mg daily  regimen changes:   intolerance:   future titration/work-up plan:           Relevant Medications    amLODIPine (NORVASC) 10 MG tablet    lisinopril (ZESTRIL) 40 MG tablet    Other Relevant Orders    Basic metabolic panel       Urinary    Clear cell renal cell carcinoma, right (H)     Diagnosed in 2/2023 when seen on imaging  - 5/31/2023: Localized cryoablation  Follows with [facility] urology            Behavioral    Major depressive disorder, recurrent episode, in partial remission (H)    Relevant Medications    escitalopram (LEXAPRO) 20 MG tablet       Other    Healthcare maintenance     CRC: Arrange for colonoscopy  Continued cigarette use -using nicotine replacement which has helped him cut back his frequency of cigarettes  Discussed adult vaccinations updated accordingly          Other Visit Diagnoses       Screen for colon cancer    -  Primary    Hypercholesteremia        Relevant Medications    atorvastatin (LIPITOR) 20 MG tablet    Screening for colorectal cancer        Relevant Orders    Colonoscopy Screening  Referral    Nicotine dependence, uncomplicated, unspecified nicotine product type                        BMI:   Estimated body mass index is 34.59 kg/m  as calculated from the following:    Height as of this encounter: 1.803 m (5' 11\").    Weight as of this encounter: " "112.5 kg (248 lb).   Weight management plan: Discussed healthy diet and exercise guidelines    FUTURE APPOINTMENTS:       - Follow-up visit in 6 months    Rigoberto Delaney MD  [facility] [place]    Savi Lopez is a 58 year old, presenting for the following health issues: Returns for follow-up.  Historically was following through [facility], now has been following here more recently.  History of longstanding hypertension.  Diagnosed with diabetes approximately 2 years ago.  On metformin monotherapy.  Earlier this year diagnosed with clear-cell carcinoma of his right kidney underwent localized cryoablation.  Follows with [facility] for surveillance monitoring.  Active tobacco user, does use nicotine replacement which has helped him reduce his overall cigarette usage.  Works in [employer] services at the [employer].  Hypertension, Depression, Kidney Problem, and Diabetes        9/29/2023     1:14 PM   Additional Questions   Roomed by Diamante COREY       Kidney Problem  This is a chronic problem. The current episode started more than 1 year ago.   History of Present Illness       Reason for visit:  Check up for meds    He eats 2-3 servings of fruits and vegetables daily.He consumes 1 sweetened beverage(s) daily.He exercises with enough effort to increase his heart rate 10 to 19 minutes per day.  He exercises with enough effort to increase his heart rate 7 days per week.   He is taking medications regularly.             Review of Systems   Constitutional, HEENT, cardiovascular, pulmonary, gi and gu systems are negative, except as otherwise noted.      Objective    /77   Pulse 75   Temp 98  F (36.7  C)   Resp 14   Ht 1.803 m (5' 11\")   Wt 112.5 kg (248 lb)   SpO2 97%   BMI 34.59 kg/m    Body mass index is 34.59 kg/m .  Physical Exam   GENERAL: healthy, alert and no distress  NECK: no adenopathy, no asymmetry, masses, or scars and thyroid normal to palpation  RESP: " lungs clear to auscultation - no rales, rhonchi or wheezes  CV: regular rate and rhythm, normal S1 S2, no S3 or S4, no murmur, click or rub, no peripheral edema and peripheral pulses strong  ABDOMEN: soft, nontender, no hepatosplenomegaly, no masses and bowel sounds normal  MS: no gross musculoskeletal defects noted, no edema

## 2023-10-02 DIAGNOSIS — N28.89 RIGHT RENAL MASS: Primary | ICD-10-CM

## 2024-01-28 ENCOUNTER — HEALTH MAINTENANCE LETTER (OUTPATIENT)
Age: 59
End: 2024-01-28

## 2024-06-16 ENCOUNTER — HEALTH MAINTENANCE LETTER (OUTPATIENT)
Age: 59
End: 2024-06-16

## 2024-10-07 DIAGNOSIS — I10 ESSENTIAL HYPERTENSION, BENIGN: ICD-10-CM

## 2024-10-07 DIAGNOSIS — F33.41 MAJOR DEPRESSIVE DISORDER, RECURRENT EPISODE, IN PARTIAL REMISSION (H): ICD-10-CM

## 2024-10-08 RX ORDER — AMLODIPINE BESYLATE 10 MG/1
10 TABLET ORAL DAILY
Qty: 90 TABLET | Refills: 3 | Status: SHIPPED | OUTPATIENT
Start: 2024-10-08

## 2024-10-08 RX ORDER — LISINOPRIL 40 MG/1
40 TABLET ORAL DAILY
Qty: 90 TABLET | Refills: 3 | Status: SHIPPED | OUTPATIENT
Start: 2024-10-08

## 2024-10-08 RX ORDER — ESCITALOPRAM OXALATE 20 MG/1
20 TABLET ORAL DAILY
Qty: 90 TABLET | Refills: 3 | Status: SHIPPED | OUTPATIENT
Start: 2024-10-08

## 2024-11-03 ENCOUNTER — HEALTH MAINTENANCE LETTER (OUTPATIENT)
Age: 59
End: 2024-11-03

## 2024-11-11 DIAGNOSIS — E11.9 TYPE 2 DIABETES MELLITUS WITHOUT COMPLICATION, WITHOUT LONG-TERM CURRENT USE OF INSULIN (H): ICD-10-CM

## 2024-11-11 NOTE — TELEPHONE ENCOUNTER
Called patient at number on file, someone answered then hung-up/disconnected the call. Will retry at later time.

## 2024-11-12 NOTE — TELEPHONE ENCOUNTER
Tried to call patient again today and he answer then hung up.    Yudelka Jose on 11/12/2024 at 2:57 PM

## 2024-11-14 NOTE — TELEPHONE ENCOUNTER
Called patient, went to voicemail but the voicemail box is full so could not leave message. Sent a [employer] message to call and scheduled an appt for further refills.

## 2024-11-14 NOTE — TELEPHONE ENCOUNTER
Prescription approved per [facility] Refill Protocol.  Padmini refill  Last Written Prescription Date:  9/29/23  Last Fill Quantity: 180,  # refills: 3   Last office visit: 9/29/2023 ; last virtual visit: 2/7/2023 with prescribing provider

## 2025-03-02 ENCOUNTER — HEALTH MAINTENANCE LETTER (OUTPATIENT)
Age: 60
End: 2025-03-02

## 2025-06-21 ENCOUNTER — HEALTH MAINTENANCE LETTER (OUTPATIENT)
Age: 60
End: 2025-06-21

## (undated) RX ORDER — PROPOFOL 10 MG/ML
INJECTION, EMULSION INTRAVENOUS
Status: DISPENSED
Start: 2023-05-31

## (undated) RX ORDER — LIDOCAINE HYDROCHLORIDE 10 MG/ML
INJECTION, SOLUTION EPIDURAL; INFILTRATION; INTRACAUDAL; PERINEURAL
Status: DISPENSED
Start: 2023-05-31

## (undated) RX ORDER — FENTANYL CITRATE-0.9 % NACL/PF 10 MCG/ML
PLASTIC BAG, INJECTION (ML) INTRAVENOUS
Status: DISPENSED
Start: 2023-05-31

## (undated) RX ORDER — CEFAZOLIN SODIUM/WATER 2 G/20 ML
SYRINGE (ML) INTRAVENOUS
Status: DISPENSED
Start: 2023-05-31

## (undated) RX ORDER — ACETAMINOPHEN 325 MG/1
TABLET ORAL
Status: DISPENSED
Start: 2023-05-31

## (undated) RX ORDER — HYDROMORPHONE HYDROCHLORIDE 1 MG/ML
INJECTION, SOLUTION INTRAMUSCULAR; INTRAVENOUS; SUBCUTANEOUS
Status: DISPENSED
Start: 2023-05-31

## (undated) RX ORDER — FENTANYL CITRATE 50 UG/ML
INJECTION, SOLUTION INTRAMUSCULAR; INTRAVENOUS
Status: DISPENSED
Start: 2023-05-31

## (undated) RX ORDER — DIPHENHYDRAMINE HYDROCHLORIDE 50 MG/ML
INJECTION INTRAMUSCULAR; INTRAVENOUS
Status: DISPENSED
Start: 2023-05-31

## (undated) RX ORDER — LABETALOL HYDROCHLORIDE 5 MG/ML
INJECTION, SOLUTION INTRAVENOUS
Status: DISPENSED
Start: 2023-05-31